# Patient Record
Sex: FEMALE | Race: BLACK OR AFRICAN AMERICAN | NOT HISPANIC OR LATINO | Employment: PART TIME | URBAN - METROPOLITAN AREA
[De-identification: names, ages, dates, MRNs, and addresses within clinical notes are randomized per-mention and may not be internally consistent; named-entity substitution may affect disease eponyms.]

---

## 2017-03-16 ENCOUNTER — OFFICE VISIT (OUTPATIENT)
Dept: FAMILY MEDICINE | Facility: CLINIC | Age: 37
End: 2017-03-16

## 2017-03-16 VITALS
WEIGHT: 255.8 LBS | TEMPERATURE: 97.9 F | DIASTOLIC BLOOD PRESSURE: 89 MMHG | HEART RATE: 85 BPM | BODY MASS INDEX: 43.67 KG/M2 | OXYGEN SATURATION: 96 % | SYSTOLIC BLOOD PRESSURE: 123 MMHG | HEIGHT: 64 IN

## 2017-03-16 DIAGNOSIS — Z11.3 SCREEN FOR STD (SEXUALLY TRANSMITTED DISEASE): ICD-10-CM

## 2017-03-16 DIAGNOSIS — Z12.4 ENCOUNTER FOR PAP CERVICAL SMEAR FOLLOWING PRIOR ABNORMAL SMEAR: Primary | ICD-10-CM

## 2017-03-16 DIAGNOSIS — B18.1 CHRONIC HEPATITIS B VIRUS INFECTION (H): ICD-10-CM

## 2017-03-16 DIAGNOSIS — Z00.00 ENCOUNTER FOR PREVENTATIVE ADULT HEALTH CARE EXAMINATION: ICD-10-CM

## 2017-03-16 PROBLEM — Z87.19: Status: ACTIVE | Noted: 2017-03-16

## 2017-03-16 NOTE — MR AVS SNAPSHOT
After Visit Summary   3/16/2017    Genaro Hamilton    MRN: [id number]           Patient Information     Date Of Birth          1980        Visit Information        Provider Department      3/16/2017 9:40 AM Zulema Cherry APRN CNP [facility]        Today's Diagnoses     Encounter for Pap cervical smear following prior abnormal smear    -  1    Encounter for preventative adult health care examination        Chronic hepatitis B virus infection (H)        Screen for STD (sexually transmitted disease)           Follow-ups after your visit        Follow-up notes from your care team     Return in about 1 year (around 3/16/2018), or if symptoms worsen or fail to improve, for BP Recheck, Physical Exam, Lab Work, Routine Visit.      Who to contact     Please call your clinic at [phone] to:    Ask questions about your health    Make or cancel appointments    Discuss your medicines    Learn about your test results    Speak to your doctor   If you have compliments or concerns about an experience at your clinic, or if you wish to file a complaint, please contact [facility] Patient Relations at [phone] or email us at [email or web address]         Additional Information About Your Visit        MyChart Information     [employer] is an electronic gateway that provides easy, online access to your medical records. With [employer], you can request a clinic appointment, read your test results, renew a prescription or communicate with your care team.     To sign up for [employer] visit the website at [email or web address]   You will be asked to enter the access code listed below, as well as some personal information. Please follow the directions to create your username and password.     Your access code is: [email or web address]  Expires: 2017  4:58 PM     Your access code will  in 90 days. If you need help or a new code, please contact your [facility] [place] [facility] or call [phone] for assistance.        Care EveryWhere ID     This is your Care EveryWhere ID. This could be used by other organizations to access your [place] medical records  [id number]        Your Vitals Were     Pulse Temperature Height Pulse Oximetry BMI (Body Mass Index)       85 97.9  F (36.6  C) (Oral) 5' 3.75\" (161.9 cm) 96% 44.25 kg/m2        Blood Pressure from Last 3 Encounters:   03/16/17 123/89   11/04/16 124/82   10/26/16 116/80    Weight from Last 3 Encounters:   03/16/17 255 lb 12.8 oz (116 kg)   11/04/16 268 lb 3.2 oz (121.7 kg)   10/26/16 267 lb 3.2 oz (121.2 kg)              We Performed the Following     Chlamydia/Gono Amplified ([facility])     GYN Cytology ([facility])        Primary Care Provider Office Phone # Fax #    Prasanna Flores [phone]       [facility] [address] [place] [address]        Thank you!     Thank you for choosing [facility]  for your care. Our goal is always to provide you with excellent care. Hearing back from our patients is one way we can continue to improve our services. Please take a few minutes to complete the written survey that you may receive in the mail after your visit with us. Thank you!             Your Updated Medication List - Protect others around you: Learn how to safely use, store and throw away your medicines at www.disposemymeds.org.          This list is accurate as of: 3/16/17 11:59 PM.  Always use your most recent med list.                   Brand Name Dispense Instructions for use    * acetaminophen 325 MG tablet    TYLENOL     Take 1 to 2 tablets every 4 hours as needed       * acetaminophen 325 MG tablet    TYLENOL    100 tablet    Take 1-2 tablets (325-650 mg) by mouth every 6 hours as needed for mild pain       CENTRUM HERBALS GARLIC PO          COCONUT OIL PO          GREEN TEA PO          HERBALS          PRENATAL 1 PO          ranitidine 75 MG " tablet    ZANTAC    30 tablet    Take 2 tablets (150 mg) by mouth 2 times daily       ULTRA WOMENS PACK Misc      Take 1 tablet by mouth daily       * Notice:  This list has 2 medication(s) that are the same as other medications prescribed for you. Read the directions carefully, and ask your doctor or other care provider to review them with you.

## 2017-03-16 NOTE — LETTER
March 20, 2017      Genaro Hamilton  [address]   [place] [address]        Dear Genaro Lam Hello. I'm pleased to report your chlamydia and gonorrhea tests returned negative, so no cervical infections noted.    Please let me know is fyou have questions about your tests, or visit, by calling [phone].    Thank you for allowing me to participate in your care,    Please see below for your test results.    Resulted Orders   Chlamydia/Gono Amplified ([facility])   Result Value Ref Range    Chlamydia trac,Amplified Prb Negative Negative    N gonorrhoeae,Amplified Prb Negative Negative    Narrative    Test performed by:  [facility]  [address] [place] [address]       If you have any questions, please call the clinic to make an appointment.    Sincerely,    BRYAN Abdi CNP

## 2017-03-16 NOTE — PROGRESS NOTES
"SUBJECTIVE: 36 year old female presents for Pap and well visit..    Past Medical History:   Diagnosis Date     Abnormal Pap smear of cervix 10/14/14    LSIL, +HPV     Hepatitis B carrier      History of malaria           LSIL=HPV pap smear . Had a treatment procedure post, has not has any FU since due to pregnancy.    Hep B chronic.  OB Hx: G:3, P:3003 All .  Had last baby girl 4 months ago, things going well.  Has 3 children, including 2 sons,  and . 2016, infant girl. Unsure whether she wants to space future children or not.  . Works in data support part time. Never smoker. Occasional ETOH. No recreational drug use.  No significant FH  OBJECTIVE: /89 (BP Location: Left arm, Patient Position: Chair, Cuff Size: Adult Large)  Pulse 85  Temp 97.9  F (36.6  C) (Oral)  Ht 5' 3.75\" (161.9 cm)  Wt 255 lb 12.8 oz (116 kg)  SpO2 96%  BMI 44.25 kg/m2  Constitutional: healthy, alert and no distress  Head: negative  Neck: Neck supple. No adenopathy. Thyroid symmetric, normal size,  ENT: ENT exam normal, no neck nodes or sinus tenderness  Cardiovascular: negative findings: regular rate and rhythm, S1 normal, S2 normal, no murmurs, clicks, or gallops  Breasts: normal without suspicious masses, skin changes or axillary nodes, symmetric fibrous changes in both upper outer quadrants, self exam is taught and encouraged.  Respiratory: negative findings: normal respiratory rate and rhythm, lungs clear to auscultation  Gastrointestinal: Abdomen soft, non-tender. BS normal. No masses, organomegaly  : Normal external genitalia without lesions and cervix appears normal, no unusual discharge or lesions.Vaginal rugae pink.  Musculoskeletal: extremities normal- no gross deformities noted, gait normal and normal muscle tone  Skin: no suspicious lesions or rashes  Neurologic: Gait normal. Reflexes normal and symmetric. Sensation grossly WNL.  Psychiatric: mentation appears normal and affect " normal/bright  Hematologic/Lymphatic/Immunologic: Normal cervical lymph nodes        ASSESSMENT/PLAN:   (Z12.4,  R87.619) Encounter for Pap cervical smear following prior abnormal smear  (primary encounter diagnosis)  Comment: Order HPV testing regardless of result  [facility]        Pending.: result, will notify w/letter if normal, by phone if abnormal.       Discussed reasons for testing and testing procedure itself.Deferred bimanual and rectovaginal exam after conversation with patient      Re: lack of evidence on value for routine women's health exam.    (Z00.00) Encounter for preventative adult health care examination  Comment: Screen for STI. Risk uncertain and patient accepted offer to screen.  Plan: Chlamydia/Gono Amplified ([facility]), GYN         Cytology ([facility])              Pending. l result, letter if normal.Will notify patient by phone if abnormal    (B18.1) Chronic hepatitis B virus infection (H)  Comment: Unclear whether monitoring has been comprehensive.  Plan: Consider referral to [place] [facility] for consult in the next year.    (Z11.3) Screen for STD (sexually transmitted disease)  Comment: As above. Recommend risk reduction by mutual monogomy vs condom use, however, unclear if condom use acceptable in patient's  Culture or Presybeterian beliefs as does not want to explore family spacing conversation in depth today.   Plan: Chlamydia[facility]Gono Ampl[facility]          Further FU pending results. RTC PRN health concerns or 1 year for repeat routine health maintenance.  30 minutes was spent on this visit, >50% counseling and coordination of care re:health maintenance,contraception    RAFAT Abdi

## 2017-03-17 LAB
C TRACH RRNA SPEC QL NAA+PROBE: NEGATIVE
N GONORRHOEA RRNA SPEC QL NAA+PROBE: NEGATIVE

## 2017-03-27 ENCOUNTER — RECORDS - HEALTHEAST (OUTPATIENT)
Dept: ADMINISTRATIVE | Facility: OTHER | Age: 37
End: 2017-03-27

## 2017-03-27 LAB
HIGH RISK?: YES
HPV REFLEX?: ABNORMAL
LAB AP ABNORMAL BLEEDING: NO
LAB AP BIRTH CONTROL/HORMONES: ABNORMAL
LAB AP CASE REPORT: ABNORMAL
LAB AP CERVICAL APPEARANCE: ABNORMAL
LAB AP GYN INTERPRETATION: ABNORMAL
LAB AP MALIGNANT?: ABNORMAL
LAB AP PATIENT STATUS: ABNORMAL
LAB AP PREVIOUS ABNL: ABNORMAL
LAB AP PREVIOUS NORMAL: ABNORMAL
LAST MENS PERIOD: ABNORMAL
SPECIMEN ADEQUACY:: ABNORMAL

## 2017-03-30 LAB
INTERPRETATION: NORMAL
INTERPRETER: NORMAL

## 2017-03-31 ENCOUNTER — TELEPHONE (OUTPATIENT)
Dept: FAMILY MEDICINE | Facility: CLINIC | Age: 37
End: 2017-03-31

## 2017-03-31 NOTE — TELEPHONE ENCOUNTER
Attempted to reach pt on home number and cellular listed  Left message on home number - cell listed is incorrect    lbetz

## 2017-04-03 ENCOUNTER — TELEPHONE (OUTPATIENT)
Dept: FAMILY MEDICINE | Facility: CLINIC | Age: 37
End: 2017-04-03

## 2017-04-03 NOTE — TELEPHONE ENCOUNTER
"ED follow up    Spoke with pt today and she reports feeling \" a little better\"      Told her to call anytime our number was 24 hrs  She said she had not known about the number and was glad to know that- voice sounded clear and strong and heard no raspiness, no coughing, said she was feeling better      lbetz  "

## 2017-04-17 PROBLEM — R87.610 PAPANICOLAOU SMEAR OF CERVIX WITH ATYPICAL SQUAMOUS CELLS OF UNDETERMINED SIGNIFICANCE (ASC-US): Status: ACTIVE | Noted: 2017-04-12

## 2017-04-19 ENCOUNTER — OFFICE VISIT (OUTPATIENT)
Dept: FAMILY MEDICINE | Facility: CLINIC | Age: 37
End: 2017-04-19

## 2017-04-19 VITALS
DIASTOLIC BLOOD PRESSURE: 86 MMHG | SYSTOLIC BLOOD PRESSURE: 132 MMHG | BODY MASS INDEX: 43.98 KG/M2 | WEIGHT: 257.6 LBS | TEMPERATURE: 98.1 F | HEIGHT: 64 IN | HEART RATE: 79 BPM

## 2017-04-19 DIAGNOSIS — R87.610 PAPANICOLAOU SMEAR OF CERVIX WITH ATYPICAL SQUAMOUS CELLS OF UNDETERMINED SIGNIFICANCE (ASC-US): Primary | ICD-10-CM

## 2017-04-19 LAB — HCG UR QL: NEGATIVE

## 2017-04-19 RX ORDER — IBUPROFEN 600 MG/1
600 TABLET, FILM COATED ORAL EVERY 6 HOURS PRN
Qty: 90 TABLET | Refills: 1 | Status: SHIPPED | OUTPATIENT
Start: 2017-04-19 | End: 2021-03-04

## 2017-04-19 NOTE — MR AVS SNAPSHOT
After Visit Summary   2017    Genaro Hamilton    MRN: [id number]           Patient Information     Date Of Birth          1980        Visit Information        Provider Department      2017 8:40 AM Shagufta Berrios MD; [facility] COLPOSCOPY ROOM [facility]        Today's Diagnoses     Papanicolaou smear of cervix with atypical squamous cells of undetermined significance (ASC-US)    -  1       Follow-ups after your visit        Who to contact     Please call your clinic at [phone] to:    Ask questions about your health    Make or cancel appointments    Discuss your medicines    Learn about your test results    Speak to your doctor   If you have compliments or concerns about an experience at your clinic, or if you wish to file a complaint, please contact [facility] Patient Relations at [phone] or email us at [email or web address]         Additional Information About Your Visit        MyChart Information     [employer] is an electronic gateway that provides easy, online access to your medical records. With [employer], you can request a clinic appointment, read your test results, renew a prescription or communicate with your care team.     To sign up for [employer]i visit the website at [email or web address]   You will be asked to enter the access code listed below, as well as some personal information. Please follow the directions to create your username and password.     Your access code is: [email or web address]  Expires: 2017  4:58 PM     Your access code will  in 90 days. If you need help or a new code, please contact your [facility] or call [phone] for assistance.        Care EveryWhere ID     This is your Care EveryWhere ID. This could be used by other organizations to access your [place] medical records  [id number]        Your Vitals Were     Pulse Temperature Height Last Period BMI (Body  "Mass Index)       79 98.1  F (36.7  C) 5' 4\" (162.6 cm) 04/01/2017 44.22 kg/m2        Blood Pressure from Last 3 Encounters:   04/19/17 132/86   03/16/17 123/89   11/04/16 124/82    Weight from Last 3 Encounters:   04/19/17 257 lb 9.6 oz (116.8 kg)   03/16/17 255 lb 12.8 oz (116 kg)   11/04/16 268 lb 3.2 oz (121.7 kg)              We Performed the Following     Colposcopy cervix with cervix biopsy and ECC     HCG Qualitative Urine (UPT)  ([facility])     Pathology  ([facility])          Today's Medication Changes          These changes are accurate as of: 4/19/17 11:59 PM.  If you have any questions, ask your nurse or doctor.               Start taking these medicines.        Dose/Directions    ibuprofen 600 MG tablet   Commonly known as:  ADVIL/MOTRIN   Used for:  Papanicolaou smear of cervix with atypical squamous cells of undetermined significance (ASC-US)   Started by:  Shagufta Berrios MD        Dose:  600 mg   Take 1 tablet (600 mg) by mouth every 6 hours as needed for moderate pain   Quantity:  90 tablet   Refills:  1            Where to get your medicines      These medications were sent to [id number] - [place], [place] - [address]  [address]  [address], [place] [address]     Phone:  [phone]     ibuprofen 600 MG tablet                Primary Care Provider Office Phone # Fax #    Prasanna Flores [phone]       [facility] [address] [place] [address]        Thank you!     Thank you for choosing [facility]  for your care. Our goal is always to provide you with excellent care. Hearing back from our patients is one way we can continue to improve our services. Please take a few minutes to complete the written survey that you may receive in the mail after your visit with us. Thank you!             Your Updated Medication List - Protect others around you: Learn how to safely use, store and throw away " your medicines at www.disposemymeds.org.          This list is accurate as of: 4/19/17 11:59 PM.  Always use your most recent med list.                   Brand Name Dispense Instructions for use    * acetaminophen 325 MG tablet    TYLENOL     Take 1 to 2 tablets every 4 hours as needed       * acetaminophen 325 MG tablet    TYLENOL    100 tablet    Take 1-2 tablets (325-650 mg) by mouth every 6 hours as needed for mild pain       CENTRUM HERBALS GARLIC PO          COCONUT OIL PO          GREEN TEA PO          HERBALS          ibuprofen 600 MG tablet    ADVIL/MOTRIN    90 tablet    Take 1 tablet (600 mg) by mouth every 6 hours as needed for moderate pain       PRENATAL 1 PO          ranitidine 75 MG tablet    ZANTAC    30 tablet    Take 2 tablets (150 mg) by mouth 2 times daily       ULTRA WOMENS PACK Misc      Take 1 tablet by mouth daily       * Notice:  This list has 2 medication(s) that are the same as other medications prescribed for you. Read the directions carefully, and ask your doctor or other care provider to review them with you.

## 2017-04-19 NOTE — PROGRESS NOTES
"  Colposcopy Visit/Procedure Note:    Genaro Hamilton is an 36 year old, , who comes in for diagnosis of abnormal pap screen.     2015 History of ART 2 s/p LEEP- conducted at [facility] (results can be found under media tab)    3/16/2017 ASCUS with negative HPV     UPT obtained and negative      Menstrual History:  Menstrual History 2015   LAST MENSTRUAL PERIOD 2015       History   Smoking Status     Never Smoker   Smokeless Tobacco     Not on file       Allergies as of 2017 - Jenaro as Reviewed 2017   Allergen Reaction Noted     Nka [no known allergies]  2014        Colposcopy Procedure:    Consent:  Details of the colposcopic procedure were reviewed. Risks, benefits of treatment, and alternate forms of evaluation were discussed.  Patient's questions were elicited and answered.   Written consent was obtained and scanned into medical record.     Verification of Procedure  Just before the procedure begins, through verbal and active participation of team members, I verified:   Initials   Patient Name tn   Patient  tn   Procedure to be performed tn       OBJECTIVE: /86  Pulse 79  Temp 98.1  F (36.7  C)  Ht 5' 4\" (162.6 cm)  Wt 257 lb 9.6 oz (116.8 kg)  LMP 2017  BMI 44.22 kg/m2    Pelvic Exam:  EG/BUS: Normal genital architecture without lesions, erythema or abnormal secretions.   Vagina: moist, pink, rugae with white, odorless and physiologic dischargesecretions  Cervix: Multiparous,      PROCEDURE:    Acetic acid and Lugol's solution applied to cervix.  Colposcopic exam of the cervix and apex of the vagina was conducted in the usual fashion.     Findings:  SCJ was  seen entirely and the exam was satisfactory.    There were acetowhite area(s) at 12:00  Lugals solution with pale yellow appearing lesion at 12:00  Biopsies were obtained at the 12 o clock position  placed in labeled Formalin Jar.    ECC: was  " obtained and placed in labeled Formalin Jar.    Assessment: cervical lesion at 12 o'clock    Plan:     Biopsies sent to pathology.  Will contact patient with results and recommended follow-up plan.     Patient advised to contact clinic with heavy vaginal bleeding, fever over 101 degrees F, or any other concerns.    Verbalized understanding and agreement with plan.    Shagufta Berrios PGY3  Attending:  Dr Aida Molina present for entire procedure

## 2017-04-24 ENCOUNTER — RECORDS - HEALTHEAST (OUTPATIENT)
Dept: ADMINISTRATIVE | Facility: OTHER | Age: 37
End: 2017-04-24

## 2017-04-24 LAB
LAB AP CASE REPORT: NORMAL
LAB AP CHARGES: NORMAL
LAB AP CLINICAL INFORMATION: NORMAL
LAB AP DIAGNOSIS COMMENT: NORMAL
LAB AP FINAL DIAGNOSIS: NORMAL
LAB AP GROSS DESCRIPTION: NORMAL
LAB AP MALIGNANT?: NORMAL
LAB AP MICROSCOPIC DESCRIPTION: NORMAL

## 2017-04-25 NOTE — PROGRESS NOTES
I was present for entirety of uncomplicated colposcopy as described and documented by Dr Berrios.  Procedure uncomplicated.  Bx pending.    Kristi Molina MD

## 2017-10-27 ENCOUNTER — ALLIED HEALTH/NURSE VISIT (OUTPATIENT)
Dept: FAMILY MEDICINE | Facility: CLINIC | Age: 37
End: 2017-10-27

## 2017-10-27 DIAGNOSIS — Z23 NEEDS FLU SHOT: Primary | ICD-10-CM

## 2017-10-27 NOTE — NURSING NOTE

## 2017-10-27 NOTE — LETTER
October 27, 2017      Genaro Hamilton  [address]   [place] [address]          To Whom it May Concern    Genaro Hamilton received her Flu Shot in clinic today 10/27/17.    Sincerely,    Pv p Nurse

## 2017-10-27 NOTE — MR AVS SNAPSHOT
After Visit Summary   10/27/2017    Genaro Hamilton    MRN: [id number]           Patient Information     Date Of Birth          1980        Visit Information        Provider Department      10/27/2017 9:30 AM NurseRaji caroline [facility]        Today's Diagnoses     Needs flu shot    -  1       Follow-ups after your visit        Who to contact     Please call your clinic at [phone] to:    Ask questions about your health    Make or cancel appointments    Discuss your medicines    Learn about your test results    Speak to your doctor   If you have compliments or concerns about an experience at your clinic, or if you wish to file a complaint, please contact [facility] Patient Relations at [phone] or email us at [email or web address]         Additional Information About Your Visit        MyChart Information     [employer] is an electronic gateway that provides easy, online access to your medical records. With [employer], you can request a clinic appointment, read your test results, renew a prescription or communicate with your care team.     To sign up for [employer] visit the website at [email or web address]   You will be asked to enter the access code listed below, as well as some personal information. Please follow the directions to create your username and password.     Your access code is: [email or web address]  Expires: 2018 10:11 AM     Your access code will  in 90 days. If you need help or a new code, please contact your [facility] or call [phone] for assistance.        Care EveryWhere ID     This is your Care EveryWhere ID. This could be used by other organizations to access your [place] medical records  [id number]         Blood Pressure from Last 3 Encounters:   17 132/86   17 123/89   16 124/82    Weight from Last 3 Encounters:   17 257 lb 9.6 oz (116.8 kg)   17 255 lb  12.8 oz (116 kg)   11/04/16 268 lb 3.2 oz (121.7 kg)              We Performed the Following     ADMIN VACCINE, INITIAL     FLU VAC PRESRV FREE QUAD SPLIT VIR IM, 0.5 mL dosage        Primary Care Provider Office Phone # Fax #    Rik Rodriguez [phone]       [facility] [address] [place] [address]        Equal Access to Services     [facility] : Hadii aad ku hadasho Soomaali, waaxda luqadaha, qaybta kaalmada adeegyada, waxay idiin hayaan adeeg kharash la'aan . So [facility] [phone].    ATENCIÓN: Si mike layton, tiene a munguia disposición servicios gratuitos de asistencia lingüística. [place] [phone].    We comply with applicable federal civil rights laws and [place] laws. We do not discriminate on the basis of race, color, national origin, age, disability, sex, sexual orientation, or gender identity.            Thank you!     Thank you for choosing [facility]  for your care. Our goal is always to provide you with excellent care. Hearing back from our patients is one way we can continue to improve our services. Please take a few minutes to complete the written survey that you may receive in the mail after your visit with us. Thank you!             Your Updated Medication List - Protect others around you: Learn how to safely use, store and throw away your medicines at www.disposemymeds.org.          This list is accurate as of: 10/27/17 10:11 AM.  Always use your most recent med list.                   Brand Name Dispense Instructions for use Diagnosis    * acetaminophen 325 MG tablet    TYLENOL     Take 1 to 2 tablets every 4 hours as needed        * acetaminophen 325 MG tablet    TYLENOL    100 tablet    Take 1-2 tablets (325-650 mg) by mouth every 6 hours as needed for mild pain    Episodic tension-type headache, not intractable       CENTRUM HERBALS GARLIC PO           COCONUT OIL PO           GREEN TEA PO           HERBALS           ibuprofen 600 MG  tablet    ADVIL/MOTRIN    90 tablet    Take 1 tablet (600 mg) by mouth every 6 hours as needed for moderate pain    Papanicolaou smear of cervix with atypical squamous cells of undetermined significance (ASC-US)       PRENATAL 1 PO           ranitidine 75 MG tablet    ZANTAC    30 tablet    Take 2 tablets (150 mg) by mouth 2 times daily    Gastroesophageal reflux disease without esophagitis       ULTRA WOMENS PACK Misc      Take 1 tablet by mouth daily        * Notice:  This list has 2 medication(s) that are the same as other medications prescribed for you. Read the directions carefully, and ask your doctor or other care provider to review them with you.

## 2018-06-06 ENCOUNTER — OFFICE VISIT (OUTPATIENT)
Dept: FAMILY MEDICINE | Facility: CLINIC | Age: 38
End: 2018-06-06
Payer: COMMERCIAL

## 2018-06-06 VITALS
BODY MASS INDEX: 42.72 KG/M2 | HEIGHT: 64 IN | SYSTOLIC BLOOD PRESSURE: 118 MMHG | TEMPERATURE: 97.4 F | WEIGHT: 250.2 LBS | DIASTOLIC BLOOD PRESSURE: 80 MMHG | OXYGEN SATURATION: 97 % | HEART RATE: 73 BPM

## 2018-06-06 DIAGNOSIS — R87.610 PAPANICOLAOU SMEAR OF CERVIX WITH ATYPICAL SQUAMOUS CELLS OF UNDETERMINED SIGNIFICANCE (ASC-US): ICD-10-CM

## 2018-06-06 DIAGNOSIS — R07.81 RIB PAIN ON RIGHT SIDE: ICD-10-CM

## 2018-06-06 DIAGNOSIS — R05.8 DRY COUGH: Primary | ICD-10-CM

## 2018-06-06 NOTE — PROGRESS NOTES
"       HPI:   Genaro Hamilton is a 37 year old  female with PMH of obesity, chronic Hep B, and abnormal pap smears who presents with:    Cough:  -dry cough, strong  -since birth of her daughter (18 months)  -off and on, often daily  -causes occasional urinary incontinence  -no fevers or chills  -does not feel sick  -no one sick at home  -no congestion, facial pain  -non smoker    Right side pain:  -right side pain, she thinks from coughing  -no increased strain/activity otherwise  -more pronounced the last 2 weeks  -hasn't taken any pain medications         PMHX:     Patient Active Problem List   Diagnosis     Chronic hepatitis B virus infection (H)     Pruritic disorder     Papanicolaou smear of cervix with low grade squamous intraepithelial lesion (LGSIL)     Papanicolaou smear of cervix with atypical squamous cells of undetermined significance (ASC-US)     Encounter for supervision of multigravida of advanced maternal age in second trimester     Morbid obesity due to excess calories (H)       Current Outpatient Prescriptions   Medication Sig Dispense Refill     ibuprofen (ADVIL/MOTRIN) 600 MG tablet Take 1 tablet (600 mg) by mouth every 6 hours as needed for moderate pain 90 tablet 1       Social History   Substance Use Topics     Smoking status: Never Smoker     Smokeless tobacco: Never Used     Alcohol use 0.0 oz/week     0 Standard drinks or equivalent per week      Comment: Occasional       Allergies   Allergen Reactions     Nka [No Known Allergies]        No results found for this or any previous visit (from the past 24 hour(s)).         Review of Systems:   See HPI.          Physical Exam:     Vitals:    06/06/18 1016   BP: 118/80   Pulse: 73   Temp: 97.4  F (36.3  C)   TempSrc: Oral   SpO2: 97%   Weight: 250 lb 3.2 oz (113.5 kg)   Height: 5' 3.98\" (162.5 cm)     Body mass index is 42.98 kg/(m^2).    General: Alert, friendly, obese,  female  HEENT: PERRL, moist oral mucus membranes, " erythematous and swollen nasal turbinates, no rhinorrhea, no throat erythema or exudates   Pulm: CTA BL, no tachypnea  CV: RRR, no murmur  Abd: soft, NTND, no masses  Skin: No rash on limited skin exam  Back: tenderness to palpation of posterior ribs 6-8  Psych: Mood appropriate to visit content, full affect, rational thought content and process    Assessment and Plan   1. Dry cough: Post-nasal drip most likely given allergic appearance of nasal canal and history. Given duration of cough a chest xray was obtained and appeared normal.   -Trial flonase and clairitin for symptoms  -Follow up in 2 weeks to evaluate for improvement of symptoms  - XR CHEST 2 VW    2. Rib pain on right side: Pain with coughing and palpation to posterior right ribs 6-8. Appears musculoskeletal in nature, possibly 2/2 coughing. No bony abnormality noted on CXR. Symptomatic treatment encouraged including alternating tylenol/ibuprofen, heat/ice.     Follow up: 2 weeks for physical  Abnormal pap history:  2015: LGSIL on pap-->CIN2 on colp s/p LEEP with clear margins  2017: ASCUS on pap, neg HPV. Per guidelines, should have a pap with cotesting at 12 and 24 months. If any abnormal-->colposcopy. If neg x2-->repeat cotesting in 3 years  Options for treatment and follow-up care were reviewed with the patient and/or guardian. Genaro Hamilton and/or guardian engaged in the decision making process and verbalized understanding of the options discussed and agreed with the final plan.    Vaishnavi Connell DO  [facility] Family Medicine Resident    Precepted with: Jae Ly MD

## 2018-06-06 NOTE — MR AVS SNAPSHOT
"              After Visit Summary   6/6/2018    Genaro Hamilton    MRN: [id number]           Patient Information     Date Of Birth          1980        Visit Information        Provider Department      6/6/2018 10:40 AM Vaishnavi Connell DO [facility]        Today's Diagnoses     Dry cough    -  1       Follow-ups after your visit        Who to contact     Please call your clinic at [phone] to:    Ask questions about your health    Make or cancel appointments    Discuss your medicines    Learn about your test results    Speak to your doctor            Additional Information About Your Visit        Care EveryWhere ID     This is your Care EveryWhere ID. This could be used by other organizations to access your [facility] medical records  [id number]        Your Vitals Were     Pulse Temperature Height Last Period Pulse Oximetry BMI (Body Mass Index)    73 97.4  F (36.3  C) (Oral) 5' 3.98\" (162.5 cm) 05/22/2018 97% 42.98 kg/m2       Blood Pressure from Last 3 Encounters:   06/06/18 118/80   04/19/17 132/86   03/16/17 123/89    Weight from Last 3 Encounters:   06/06/18 250 lb 3.2 oz (113.5 kg)   04/19/17 257 lb 9.6 oz (116.8 kg)   03/16/17 255 lb 12.8 oz (116 kg)              We Performed the Following     XR CHEST 2 VW        Primary Care Provider Office Phone # Fax #    Rik Rodriguez [phone]       [facility] [address] [place] [address]        Equal Access to Services     ZION CASTELLANOS AH: Hadii aad ku hadasho Soomaali, waaxda luqadaha, qaybta kaalmada adeegyada, waxnina idiin haydulcen carin arrington la'vita . So [facility] [phone].    ATENCIÓN: Si habla español, tiene a munguia disposición servicios gratuitos de asistencia lingüística. Llame al [phone].    We comply with applicable federal civil rights laws and [place] laws. We do not discriminate on the basis of race, color, national origin, age, disability, sex, sexual orientation, or gender " identity.            Thank you!     Thank you for choosing [facility]  for your care. Our goal is always to provide you with excellent care. Hearing back from our patients is one way we can continue to improve our services. Please take a few minutes to complete the written survey that you may receive in the mail after your visit with us. Thank you!             Your Updated Medication List - Protect others around you: Learn how to safely use, store and throw away your medicines at www.disposemymeds.org.          This list is accurate as of 6/6/18 11:30 AM.  Always use your most recent med list.                   Brand Name Dispense Instructions for use Diagnosis    * acetaminophen 325 MG tablet    TYLENOL     Take 1 to 2 tablets every 4 hours as needed        * acetaminophen 325 MG tablet    TYLENOL    100 tablet    Take 1-2 tablets (325-650 mg) by mouth every 6 hours as needed for mild pain    Episodic tension-type headache, not intractable       CENTRUM HERBALS GARLIC PO           COCONUT OIL PO           GREEN TEA PO           HERBALS           ibuprofen 600 MG tablet    ADVIL/MOTRIN    90 tablet    Take 1 tablet (600 mg) by mouth every 6 hours as needed for moderate pain    Papanicolaou smear of cervix with atypical squamous cells of undetermined significance (ASC-US)       PRENATAL 1 PO           ranitidine 75 MG tablet    ZANTAC    30 tablet    Take 2 tablets (150 mg) by mouth 2 times daily    Gastroesophageal reflux disease without esophagitis       ULTRA WOMENS PACK Misc      Take 1 tablet by mouth daily        * Notice:  This list has 2 medication(s) that are the same as other medications prescribed for you. Read the directions carefully, and ask your doctor or other care provider to review them with you.

## 2018-06-26 ENCOUNTER — HEALTH MAINTENANCE LETTER (OUTPATIENT)
Age: 38
End: 2018-06-26

## 2018-10-04 ENCOUNTER — OFFICE VISIT (OUTPATIENT)
Dept: FAMILY MEDICINE | Facility: CLINIC | Age: 38
End: 2018-10-04
Payer: COMMERCIAL

## 2018-10-04 VITALS
RESPIRATION RATE: 19 BRPM | TEMPERATURE: 98.1 F | SYSTOLIC BLOOD PRESSURE: 127 MMHG | DIASTOLIC BLOOD PRESSURE: 86 MMHG | WEIGHT: 252.5 LBS | OXYGEN SATURATION: 98 % | HEART RATE: 68 BPM | BODY MASS INDEX: 43.11 KG/M2 | HEIGHT: 64 IN

## 2018-10-04 DIAGNOSIS — Z12.4 SCREENING FOR CERVICAL CANCER: ICD-10-CM

## 2018-10-04 DIAGNOSIS — Z00.00 ROUTINE HISTORY AND PHYSICAL EXAMINATION OF ADULT: Primary | ICD-10-CM

## 2018-10-04 DIAGNOSIS — G44.209 TENSION HEADACHE: ICD-10-CM

## 2018-10-04 DIAGNOSIS — Z11.51 SCREENING FOR HUMAN PAPILLOMAVIRUS: ICD-10-CM

## 2018-10-04 DIAGNOSIS — R05.9 COUGH: ICD-10-CM

## 2018-10-04 LAB
ALBUMIN SERPL-MCNC: 3.4 G/DL (ref 3.3–4.6)
ALP SERPL-CCNC: 40 U/L (ref 40–150)
ALT SERPL-CCNC: 28 U/L (ref 0–45)
AST SERPL-CCNC: 27 U/L (ref 0–45)
BILIRUB SERPL-MCNC: 0.5 MG/DL (ref 0.2–1.3)
BUN SERPL-MCNC: 6 MG/DL (ref 5–24)
CALCIUM SERPL-MCNC: 9 MG/DL (ref 8.5–10.4)
CHLORIDE SERPLBLD-SCNC: 103 MMOL/L (ref 94–109)
CO2 SERPL-SCNC: 28 MMOL/L (ref 20–32)
CREAT SERPL-MCNC: 0.8 MG/DL (ref 0.6–1.3)
EGFR CALCULATED (BLACK REFERENCE): >90 ML/MIN
EGFR CALCULATED (NON BLACK REFERENCE): 85.3 ML/MIN
GLUCOSE SERPL-MCNC: 90 MG/DL (ref 60–109)
HEMOGLOBIN: 12.1 G/DL (ref 11.7–15.7)
POTASSIUM SERPL-SCNC: 4.2 MMOL/L (ref 3.4–5.3)
PROT SERPL-MCNC: 6.9 G/DL (ref 6.6–8.8)
SODIUM SERPL-SCNC: 140 MMOL/L (ref 133–144)

## 2018-10-04 RX ORDER — BENZONATATE 100 MG/1
100 CAPSULE ORAL 3 TIMES DAILY PRN
Qty: 42 CAPSULE | Refills: 3 | Status: SHIPPED | OUTPATIENT
Start: 2018-10-04 | End: 2019-11-08

## 2018-10-04 NOTE — PATIENT INSTRUCTIONS
You had a pap test and lab work today. We will send you a letter with your results.  Take tylenol and ibuprofen for your headache. You have nasal congestions that will get better with time and fluids.  Take the Tessalon pills for your cough.        Preventive Health Recommendations  Female Ages 26 - 39  Yearly exam:   See your health care provider every year in order to    Review health changes.     Discuss preventive care.      Review your medicines if you your doctor has prescribed any.    Until age 30: Get a Pap test every three years (more often if you have had an abnormal result).    After age 30: Talk to your doctor about whether you should have a Pap test every 3 years or have a Pap test with HPV screening every 5 years.   You do not need a Pap test if your uterus was removed (hysterectomy) and you have not had cancer.  You should be tested each year for STDs (sexually transmitted diseases), if you're at risk.   Talk to your provider about how often to have your cholesterol checked.  If you are at risk for diabetes, you should have a diabetes test (fasting glucose).  Shots: Get a flu shot each year. Get a tetanus shot every 10 years.   Nutrition:     Eat at least 5 servings of fruits and vegetables each day.    Eat whole-grain bread, whole-wheat pasta and brown rice instead of white grains and rice.    Get adequate Calcium and Vitamin D.     Lifestyle    Exercise at least 150 minutes a week (30 minutes a day, 5 days of the week). This will help you control your weight and prevent disease.    Limit alcohol to one drink per day.    No smoking.     Wear sunscreen to prevent skin cancer.    See your dentist every six months for an exam and cleaning.

## 2018-10-04 NOTE — MR AVS SNAPSHOT
After Visit Summary   10/4/2018    Genaro Hamilton    MRN: [id number]           Patient Information     Date Of Birth          1980        Visit Information        Provider Department      10/4/2018 9:20 AM Lorne Kaminski MD [facility]        Today's Diagnoses     Cough    -  1    Screening for cervical cancer        Screening for human papillomavirus        Tension headache        Routine history and physical examination of adult          Care Instructions    You had a pap test and lab work today. We will send you a letter with your results.  Take tylenol and ibuprofen for your headache. You have nasal congestions that will get better with time and fluids.  Take the Tessalon pills for your cough.        Preventive Health Recommendations  Female Ages 26 - 39  Yearly exam:   See your health care provider every year in order to    Review health changes.     Discuss preventive care.      Review your medicines if you your doctor has prescribed any.    Until age 30: Get a Pap test every three years (more often if you have had an abnormal result).    After age 30: Talk to your doctor about whether you should have a Pap test every 3 years or have a Pap test with HPV screening every 5 years.   You do not need a Pap test if your uterus was removed (hysterectomy) and you have not had cancer.  You should be tested each year for STDs (sexually transmitted diseases), if you're at risk.   Talk to your provider about how often to have your cholesterol checked.  If you are at risk for diabetes, you should have a diabetes test (fasting glucose).  Shots: Get a flu shot each year. Get a tetanus shot every 10 years.   Nutrition:     Eat at least 5 servings of fruits and vegetables each day.    Eat whole-grain bread, whole-wheat pasta and brown rice instead of white grains and rice.    Get adequate Calcium and Vitamin D.     Lifestyle    Exercise at least 150 minutes a week (30 minutes a day, 5  "days of the week). This will help you control your weight and prevent disease.    Limit alcohol to one drink per day.    No smoking.     Wear sunscreen to prevent skin cancer.    See your dentist every six months for an exam and cleaning.            Follow-ups after your visit        Who to contact     Please call your clinic at [phone] to:    Ask questions about your health    Make or cancel appointments    Discuss your medicines    Learn about your test results    Speak to your doctor            Additional Information About Your Visit        Care EveryWhere ID     This is your Care EveryWhere ID. This could be used by other organizations to access your [facility] medical records  [id number]        Your Vitals Were     Pulse Temperature Respirations Height Last Period Pulse Oximetry    68 98.1  F (36.7  C) (Oral) 19 5' 3.78\" (162 cm) 09/28/2018 98%    BMI (Body Mass Index)                   43.64 kg/m2            Blood Pressure from Last 3 Encounters:   10/04/18 127/86   06/06/18 118/80   04/19/17 132/86    Weight from Last 3 Encounters:   10/04/18 252 lb 8 oz (114.5 kg)   06/06/18 250 lb 3.2 oz (113.5 kg)   04/19/17 257 lb 9.6 oz (116.8 kg)              We Performed the Following     Comprehensive Metabolic Panel ([facility]) - results <1hr Protocol     GYN Cytology ([facility])     Hemoglobin (HGB) ([facility])     HPV [place] PCR ([facility])          Today's Medication Changes          These changes are accurate as of 10/4/18 10:33 AM.  If you have any questions, ask your nurse or doctor.               Start taking these medicines.        Dose/Directions    benzonatate 100 MG capsule   Commonly known as:  TESSALON   Used for:  Cough   Started by:  Lorne Kaminski MD        Dose:  100 mg   Take 1 capsule (100 mg) by mouth 3 times daily as needed for cough   Quantity:  42 capsule   Refills:  3            Where to get your medicines      These medications were sent to [id number] - [place], " [place] - [address]  [address], [place] [address]     Phone:  [phone]     benzonatate 100 MG capsule                Primary Care Provider Office Phone # Fax #    Rik Rodriguez [phone]       [facility] [address] [place] [address]        Equal Access to Services     ZION CASTELLANOS : Hadii aad ku hadasho Soomaali, waaxda luqadaha, qaybta kaalmada adeegyada, waxay idiin hayaan adeeg kharash la'dulcen . So [facility] [phone].    ATENCIÓN: Si anjalila español, tiene a munguia disposición servicios gratuitos de asistencia lingüística. Llame al [phone].    We comply with applicable federal civil rights laws and [place] laws. We do not discriminate on the basis of race, color, national origin, age, disability, sex, sexual orientation, or gender identity.            Thank you!     Thank you for choosing [facility]  for your care. Our goal is always to provide you with excellent care. Hearing back from our patients is one way we can continue to improve our services. Please take a few minutes to complete the written survey that you may receive in the mail after your visit with us. Thank you!             Your Updated Medication List - Protect others around you: Learn how to safely use, store and throw away your medicines at www.disposemymeds.org.          This list is accurate as of 10/4/18 10:33 AM.  Always use your most recent med list.                   Brand Name Dispense Instructions for use Diagnosis    benzonatate 100 MG capsule    TESSALON    42 capsule    Take 1 capsule (100 mg) by mouth 3 times daily as needed for cough    Cough       ibuprofen 600 MG tablet    ADVIL/MOTRIN    90 tablet    Take 1 tablet (600 mg) by mouth every 6 hours as needed for moderate pain    Papanicolaou smear of cervix with atypical squamous cells of undetermined significance (ASC-US)

## 2018-10-04 NOTE — LETTER
RETURN TO WORK/SCHOOL FORM    10/4/2018    Re: Genaro Hamilton  1980      To Whom It May Concern:     Genaro Hamilton was seen in clinic today..  She may return to work with restrictions on 10/04/18        Restrictions:  Nonefull duty      Lorne Olguin MD  10/4/2018 11:01 AM

## 2018-10-04 NOTE — PROGRESS NOTES
Preceptor Attestation:   Patient seen, evaluated and discussed with the resident, Dr. Lorne Olguin. I have verified the content of the note, which accurately reflects my assessment of the patient and the plan of care.  Supervising Physician:Alexandra Edgar MD  [facility]

## 2018-10-04 NOTE — PROGRESS NOTES
Female Physical Note  Genaro Hamilton is a 38 year old female with PMH significant for chronic hep B infection, morbid obesity, prior abnormal pap who presents to the clinic today for the following:       Concerns today:   1. Headache    Last 5 days has had a cold, congestion, and headache that is pulsing. She has a history of headaches and this feels similar. Usually headaches go away with ibuprofen but this has not. Patient does not want to pay for any new medications.    2. Cough   Was here 3 months ago for cough that was related to allergies. Describes post nasal drip symptoms today causing her pain. Has a cold right now and feels congested. In the past was prescribed Claritin and Flonase but does not want to pay for them today.      ROS:  CONSTITUTIONAL: no fatigue, no unexpected change in weight  SKIN: no worrisome rashes, no worrisome moles, no worrisome lesions  EYES: no acute vision problems or changes  ENT: no ear problems, no mouth problems, no throat problems  RESP: no significant cough, no shortness of breath  CV: no chest pain, no palpitations, no new or worsening peripheral edema  GI: no nausea, no vomiting, no constipation, no diarrhea    Sexually Active: Yes  Sexual concerns: No   Contraception:Details: not using birth control, ok to be pregnant again    P: 3013  Menarche: not sure, primary school Patient's last menstrual period was 2018. last week   STD History: Neg  Last Pap Smear Date: 2017  Abnormal Pap History:   2015: LGSIL on pap-->CIN2 on colp s/p LEEP with clear margins  2017: ASCUS on pap, neg HPV. Per guidelines, should have a pap with cotesting at 12 and 24 months. If any abnormal-->colposcopy. If neg x2-->repeat cotesting in 3 years      Patient Active Problem List   Diagnosis     Chronic hepatitis B virus infection (H)     Pruritic disorder     Papanicolaou smear of cervix with low grade squamous intraepithelial lesion (LGSIL)     Papanicolaou smear of cervix  with atypical squamous cells of undetermined significance (ASC-US)     Encounter for supervision of multigravida of advanced maternal age in second trimester     Morbid obesity due to excess calories (H)       Current Outpatient Prescriptions   Medication Sig Dispense Refill     benzonatate (TESSALON) 100 MG capsule Take 1 capsule (100 mg) by mouth 3 times daily as needed for cough 42 capsule 3     ibuprofen (ADVIL/MOTRIN) 600 MG tablet Take 1 tablet (600 mg) by mouth every 6 hours as needed for moderate pain 90 tablet 1       Past Medical History:   Diagnosis Date     Abnormal Pap smear of cervix 10/14/14    LSIL, +HPV     Hepatitis B carrier (H)      History of malaria 2011        Family History     Problem (# of Occurrences) Relation (Name,Age of Onset)    Other - See Comments (1) Mother: chest pain       Negative family history of: Diabetes, Coronary Artery Disease, Hypertension, Breast Cancer, Colon Cancer, Prostate Cancer, Other Cancer, Cerebrovascular Disease, Asthma          Problem List Medication List and Allergy List were reviewed.    Patient is an established patient of this clinic..    Social History   Substance Use Topics     Smoking status: Former Smoker     Years: 5.00     Smokeless tobacco: Never Used     Alcohol use 0.0 oz/week     0 Standard drinks or equivalent per week      Comment: Occasional       Children ? yes 3    Has anyone hurt you physically, for example by pushing, hitting, slapping or kicking you or forcing you to have sex? Denies  Do you feel threatened or controlled by a partner, ex-partner or anyone in your life? Denies    RISK BEHAVIORS AND HEALTHY HABITS:  Tobacco Use/Smoking: None  Illicit Drug Use: None  Do you use alcohol? Occasional   Diet (5-7 servings of fruits/veg daily): Yes   Exercise (30 min accumulated most days): no, wants to start. Says weight has been up and down.   Dental Care: No   Calcium 1500 mg/d:  Yes  Seat Belt Use: Yes     Pooled Cohort Risk  "Calculator    Immunization History   Administered Date(s) Administered     Influenza (IIV3) PF 12/09/2011, 11/01/2013     Influenza Vaccine IM 3yrs+ 4 Valent IIV4 10/15/2014, 10/23/2015, 09/28/2016, 10/27/2017     Influenza Vaccine, 3 YRS +, IM (QUADRIVALENT W/PRESERVATIVES) 10/04/2018     Mantoux Tuberculin Skin Test 03/21/2014     TDAP Vaccine (Boostrix) 09/28/2016     Tdap (Adacel,Boostrix) 08/09/2012    Reviewed Immunization Record Today and influenza due today.    EXAMINATION:   /86 (BP Location: Right arm, Patient Position: Sitting, Cuff Size: Adult Large)  Pulse 68  Temp 98.1  F (36.7  C) (Oral)  Resp 19  Ht 5' 3.78\" (162 cm)  Wt 252 lb 8 oz (114.5 kg)  LMP 09/28/2018  SpO2 98%  BMI 43.64 kg/m2  GENERAL: healthy, alert and no distress  EYES: Eyes grossly normal to inspection, extraocular movements - intact, and PERRL  HENT: ear canals- normal; TMs- normal; Nose- normal; Mouth- no ulcers, no lesions  NECK: no tenderness, no adenopathy, no asymmetry, no masses, no stiffness; thyroid- normal to palpation  RESP: lungs clear to auscultation - no rales, no rhonchi, no wheezes  BREAST: no masses, no tenderness, no nipple discharge, no palpable axillary masses or adenopathy  CV: regular rates and rhythm, normal S1 S2, no S3 or S4 and no murmur, no click or rub -  ABDOMEN: soft, no tenderness, no  hepatosplenomegaly, no masses, normal bowel sounds. Obese.  MS: extremities- no gross deformities noted, no edema  SKIN: no suspicious lesions, no rashes  NEURO: strength and tone- normal, sensory exam- grossly normal, mentation- intact, speech- normal, reflexes- symmetric  - female: cervix- normal, adnexae- normal; uterus- normal, no masses, no discharge  PSYCH: Alert and oriented times 3; speech- coherent , normal rate and volume; able to articulate logical thoughts    ASSESSMENT:    (Z00.00) Routine history and physical examination of adult  (primary encounter diagnosis)  Comment: Patient has history of " hep B and is wondering she could have las checked, has not had any in a while. Also has regular but sometimes heavy periods so will check Hgb  Plan: Comprehensive Metabolic Panel ([facility]) -         results <1hr Protocol, Hemoglobin (HGB) ([facility]), ADMIN VACCINE, INITIAL, FLU VAC         QUADRIVLENT SPLIT VIRUS IM 0.5ml dosage  - will check CMP based on history of hep B and patient concerned about this history  - has regular but sometimes heavy periods per patient. Will check hgb        (Z12.4) Screening for cervical cancer  Comment: No abnormal discharge, itching, burning, or pain. No concern for STD. Patient agree to pap and HPV test today but declines any other testing. 2015: LGSIL on pap-->CIN2 on colp s/p LEEP with clear margins  2017: ASCUS on pap, neg HPV. Per guidelines, should have a pap with cotesting at 12 and 24 months. If any abnormal-->colposcopy. If neg x2-->repeat cotesting in 3 years  Plan: HPV [place] PCR ([facility]), GYN Cytology         ([facility]), CANCELED: Wet Prep ([facility])    (Z11.51) Screening for human papillomavirus  Comment: No abnormal discharge, itching, burning, or pain. No concern for STD. Patient agree to pap and HPV test today but declines any other testing. 2015: LGSIL on pap-->CIN2 on colp s/p LEEP with clear margins  2017: ASCUS on pap, neg HPV. Per guidelines, should have a pap with cotesting at 12 and 24 months. If any abnormal-->colposcopy. If neg x2-->repeat cotesting in 3 years  Plan: HPV [place] PCR ([facility]), GYN Cytology         ([facility]), CANCELED: Wet Prep ([facility])      (R05) Cough  Comment: Symptoms sound like post nasal drip. Has symptoms of allergies. In the past has been prescribed Claritin and Flonase but patient does not want to pay for these.   Plan: benzonatate (TESSALON) 100 MG capsule  - educated patient on fluids and time to help with cold symptoms she's currently experiencing. Has symptoms of allergies in the past but again patient declines to  buy Claritin or Flonase  - will try tessalon     (G44.209) Tension headache  Comment: Likely due to current cold aggravating her existing history of tension headaches.   Plan:   - educated patient on headache and warning signs  - she plans to use ibuprofen and tylenol from home           Lorne Olguin MD   PGY1 [facility] Resident   Pager: [phone]    Precepted with Dr. Edgar

## 2018-10-04 NOTE — NURSING NOTE
Genaro Hamilton      1.  Has the patient received the information for the influenza vaccine? NO    2.  Does the patient have any of the following contraindications?     Allergy to eggs? No     Allergic reaction to previous influenza vaccines? No     Any other problems to previous influenza vaccines? No     Paralyzed by Guillain-Catlin syndrome? No     Currently pregnant? NO     Current moderate or severe illness? No    Vaccination given by Nicolasa Null MA.  Recorded by Nicolasa Null

## 2018-10-05 ENCOUNTER — RECORDS - HEALTHEAST (OUTPATIENT)
Dept: ADMINISTRATIVE | Facility: OTHER | Age: 38
End: 2018-10-05

## 2018-10-05 LAB
FINAL DIAGNOSIS: NORMAL
HPV 16 DNA: NEGATIVE
HPV 18 DNA: NEGATIVE
HPV SOURCE: NORMAL
OTHER HR HPV: NEGATIVE
SPECIMEN DESCRIPTION: NORMAL

## 2018-10-17 ENCOUNTER — RECORDS - HEALTHEAST (OUTPATIENT)
Dept: ADMINISTRATIVE | Facility: OTHER | Age: 38
End: 2018-10-17

## 2018-10-17 LAB
CYTOLOGY CVX/VAG DOC THIN PREP: NORMAL
HIGH RISK?: YES
HPV REFLEX?: NORMAL
LAB AP ABNORMAL BLEEDING: NO
LAB AP BIRTH CONTROL/HORMONES: NORMAL
LAB AP CASE REPORT: NORMAL
LAB AP CERVICAL APPEARANCE: NORMAL
LAB AP MALIGNANT?: NORMAL
LAB AP PATIENT STATUS: NORMAL
LAB AP PREVIOUS ABNL: 2017
LAB AP PREVIOUS NORMAL: 2017
LAST MENS PERIOD: NORMAL
SPECIMEN ADEQUACY:: NORMAL

## 2019-02-22 NOTE — PROGRESS NOTES
Please call:    Bang grant,  Your biopsy results are back and look very good.  There are some atypical cells which were also seen on your pap smear- the HPV test is negative which is reassuring.  We will recheck your pap smear and HPV testing in 1 year.  Thank you  Dr Berrios     Presented with flash pulmonary edema  Acute on chronic CHF  NSTEMI.  Plan is for [facility] today.

## 2019-11-08 ENCOUNTER — OFFICE VISIT (OUTPATIENT)
Dept: FAMILY MEDICINE | Facility: CLINIC | Age: 39
End: 2019-11-08
Payer: COMMERCIAL

## 2019-11-08 VITALS
RESPIRATION RATE: 20 BRPM | WEIGHT: 256 LBS | HEART RATE: 74 BPM | OXYGEN SATURATION: 99 % | DIASTOLIC BLOOD PRESSURE: 88 MMHG | BODY MASS INDEX: 43.71 KG/M2 | HEIGHT: 64 IN | SYSTOLIC BLOOD PRESSURE: 134 MMHG

## 2019-11-08 DIAGNOSIS — E66.01 CLASS 3 SEVERE OBESITY DUE TO EXCESS CALORIES WITHOUT SERIOUS COMORBIDITY WITH BODY MASS INDEX (BMI) OF 45.0 TO 49.9 IN ADULT (H): ICD-10-CM

## 2019-11-08 DIAGNOSIS — Z12.4 SCREENING FOR CERVICAL CANCER: ICD-10-CM

## 2019-11-08 DIAGNOSIS — B18.1 CHRONIC VIRAL HEPATITIS B WITHOUT DELTA AGENT AND WITHOUT COMA (H): Primary | ICD-10-CM

## 2019-11-08 DIAGNOSIS — Z00.00 ROUTINE GENERAL MEDICAL EXAMINATION AT A HEALTH CARE FACILITY: ICD-10-CM

## 2019-11-08 DIAGNOSIS — E66.813 CLASS 3 SEVERE OBESITY DUE TO EXCESS CALORIES WITHOUT SERIOUS COMORBIDITY WITH BODY MASS INDEX (BMI) OF 45.0 TO 49.9 IN ADULT (H): ICD-10-CM

## 2019-11-08 LAB — HBA1C MFR BLD: 4.9 % (ref 4.1–5.7)

## 2019-11-08 ASSESSMENT — MIFFLIN-ST. JEOR: SCORE: 1817.72

## 2019-11-08 NOTE — PROGRESS NOTES
Female Physical Note    Concerns today: Concerned about weight. Has been trying to exercise. Trying to watch what she eats. No sugary beverages. Eats a lot of rice. 15-20 minutes twice a week on treadmill.     Weight gain became an issue after she had her last kid. Has not been able to lose the baby weight.     Also states that her 's license has the wrong birth date and she recently found out she was actually born in  rather than . She does not have access to her birth certificate as it is in [place].         ROS:  CONSTITUTIONAL: no fatigue, no unexpected change in weight  SKIN: no worrisome rashes, no worrisome moles, no worrisome lesions  EYES: no acute vision problems or changes  ENT: no ear problems, no mouth problems, no throat problems  RESP: no significant cough, no shortness of breath  CV: no chest pain, no palpitations, no new or worsening peripheral edema  GI: no nausea, no vomiting, no constipation, no diarrhea    Sexually Active: Yes  Sexual concerns: No   Contraception:Details: withdrawal    P: 3  Menarche: Patient unsure Patient's last menstrual period was 10/22/2019. Menses: q 30 days  Lastin-5 days,  Normal  STD History: Pos chlamydia  Last Pap Smear Date:  10/4/2019  2015: LGSIL on pap-->CIN2 on colp s/p LEEP with clear margins  2017: ASCUS on pap, neg HPV. Per guidelines, should have a pap with cotesting at 12 and 24 months. If any abnormal-->colposcopy. If neg x2-->repeat cotesting in 3 years  2018: Negative Pap and HPV testing    Patient Active Problem List   Diagnosis     Chronic hepatitis B virus infection (H)     Pruritic disorder     Papanicolaou smear of cervix with low grade squamous intraepithelial lesion (LGSIL)     Papanicolaou smear of cervix with atypical squamous cells of undetermined significance (ASC-US)     Encounter for supervision of multigravida of advanced maternal age in second trimester     Morbid obesity due to excess calories (H)       Current  Outpatient Medications   Medication Sig Dispense Refill     ibuprofen (ADVIL/MOTRIN) 600 MG tablet Take 1 tablet (600 mg) by mouth every 6 hours as needed for moderate pain 90 tablet 1     benzonatate (TESSALON) 100 MG capsule Take 1 capsule (100 mg) by mouth 3 times daily as needed for cough (Patient not taking: Reported on 11/8/2019) 42 capsule 3       Past Medical History:   Diagnosis Date     Abnormal Pap smear of cervix 10/14/14    LSIL, +HPV     Hepatitis B carrier (H)      History of malaria 2011        Family History     Problem (# of Occurrences) Relation (Name,Age of Onset)    Other - See Comments (1) Mother: chest pain       Negative family history of: Diabetes, Coronary Artery Disease, Hypertension, Breast Cancer, Colon Cancer, Prostate Cancer, Other Cancer, Cerebrovascular Disease, Asthma          Problem List Medication List and Allergy List were reviewed.    Patient is an established patient of this clinic..    Social History     Tobacco Use     Smoking status: Former Smoker     Years: 5.00     Smokeless tobacco: Never Used   Substance Use Topics     Alcohol use: Yes     Alcohol/week: 0.0 standard drinks     Comment: Occasional       Children ? yes 3 children    Has anyone hurt you physically, for example by pushing, hitting, slapping or kicking you or forcing you to have sex? Denies  Do you feel threatened or controlled by a partner, ex-partner or anyone in your life? Denies    RISK BEHAVIORS AND HEALTHY HABITS:  Tobacco Use/Smoking: sometimes smokes marijuana   Illicit Drug Use: None  Do you use alcohol? Yes  Number of drinks per day occasional  Number of drinking days a week 1-2  Diet (5-7 servings of fruits/veg daily): Yes   Exercise (30 min accumulated most days):No  Dental Care: Yes   Calcium 1500 mg/d:  Yes  Seat Belt Use: Yes     Pap/HPV cotest every 5 years for women 30-65   Recommended and patient accepted testing.  CV Risk based on Pooled Cohort Risk:Not applicable   Pooled Cohort  Risk Calculator    Immunization History   Administered Date(s) Administered     Influenza (IIV3) PF 12/09/2011, 11/01/2013     Influenza Vaccine IM > 6 months Valent IIV4 10/15/2014, 10/23/2015, 09/28/2016, 10/27/2017, 10/04/2018     Mantoux Tuberculin Skin Test 03/21/2014     TDAP Vaccine (Boostrix) 09/28/2016     Tdap (Adacel,Boostrix) 08/09/2012    Reviewed Immunization Record Today    EXAMINATION:   /88   Pulse 74   Resp 20   Wt 116.1 kg (256 lb)   LMP 10/22/2019   SpO2 99%   BMI 44.25 kg/m    GENERAL: healthy, alert and no distress  EYES: Eyes grossly normal to inspection, extraocular movements - intact, and PERRL  HENT: ear canals- normal; TMs- normal; Nose- normal; Mouth- no ulcers, no lesions  NECK: no tenderness, no adenopathy, no asymmetry, no masses, no stiffness; thyroid- normal to palpation  RESP: lungs clear to auscultation - no rales, no rhonchi, no wheezes  CV: regular rates and rhythm, normal S1 S2, no S3 or S4 and no murmur, no click or rub -  ABDOMEN: soft, no tenderness, no  hepatosplenomegaly, no masses, normal bowel sounds  MS: extremities- no gross deformities noted, no edema  SKIN: no suspicious lesions, no rashes  NEURO: strength and tone- normal, sensory exam- grossly normal, mentation- intact, speech- normal, reflexes- symmetric  BACK: no CVA tenderness, no paralumbar tenderness  - female: cervix- normal, adnexae- normal; uterus- normal, no masses, no discharge  PSYCH: Alert and oriented times 3; speech- coherent , normal rate and volume; able to articulate logical thoughts, able to abstract reason, no tangential thoughts, no hallucinations or delusions, affect- normal  LYMPHATICS: ant. cervical- normal, post. cervical- normal, axillary- normal, supraclavicular- normal    ASSESSMENT/PLAN:  1. Routine general medical examination at a health care facility   2. Screening for cervical cancer  Concerns today include: morbid obesity. See below. Patient would like diabetes screening  as well. Counseled on need for contraception however declines at this time. Will continue with condoms. Per patient's history she is >40 as she states she was actually born in 1977. She is not able to provide documentation for this (birth certificate is in [place]) so even though she would meet requirement for mammo screening it will likely not be covered by insurance. Will plan to perform mammo screening this next August when she is 40 according to our records. Discussed with patient that she could pay out of pocket for the screening but she would like to defer this until 8/2020.  Hx of ASCUS  2015: LGSIL on pap-->CIN2 on colp s/p LEEP with clear margins  2017: ASCUS on pap, neg HPV. Per guidelines, should have a pap with cotesting at 12 and 24 months. If any abnormal-->colposcopy. If neg x2-->repeat cotesting in 3 years  2018: Negative Pap and HPV testing  Today is 24 months out from ASCUS so per guidelines will obtain both PAP and HPV. If abnormal will proceed with colposcopy. If negative will repeat cotesting in 3 years.   - [facility] Cytology ([facility])  - Hemoglobin A1c ([facility])    3. Chronic viral hepatitis B without delta agent and without coma (H)  She states the Hep B is in remission. Most recent scanned document from [facility] (in 2014) requested follow-up in 6 months she has not seen them since. CMP within normal limits at CPE 1 year ago.   - GASTROENTEROLOGY ADULT REF CONSULT ONLY    4. Class 3 severe obesity due to excess calories without serious comorbidity with body mass index (BMI) of 45.0 to 49.9 in adult (H)  Has not been able to lose baby weight. Would like to try medication management as has not had success with diet/exercise. We discussed healthy eating habits including, low carb/high protein meals, no sugary beverages, less sweets and exercise habits including 20 minutes of cardio exercise per day. Will also refer to bariatric clinic for medication weight management.   - BARIATRIC ADULT REFERRAL;  Future

## 2019-11-08 NOTE — PATIENT INSTRUCTIONS
1. Pap test and HPV testing today we will call you with the results.  2. Lab work today  3. Please bring in your birth certificate at your next visit.       Preventive Health Recommendations  Female Ages 26 - 39  Yearly exam:   See your health care provider every year in order to    Review health changes.     Discuss preventive care.      Review your medicines if you your doctor has prescribed any.    Until age 30: Get a Pap test every three years (more often if you have had an abnormal result).    After age 30: Talk to your doctor about whether you should have a Pap test every 3 years or have a Pap test with HPV screening every 5 years.   You do not need a Pap test if your uterus was removed (hysterectomy) and you have not had cancer.  You should be tested each year for STDs (sexually transmitted diseases), if you're at risk.   Talk to your provider about how often to have your cholesterol checked.  If you are at risk for diabetes, you should have a diabetes test (fasting glucose).  Shots: Get a flu shot each year. Get a tetanus shot every 10 years.   Nutrition:     Eat at least 5 servings of fruits and vegetables each day.    Eat whole-grain bread, whole-wheat pasta and brown rice instead of white grains and rice.    Get adequate Calcium and Vitamin D.     Lifestyle    Exercise at least 150 minutes a week (30 minutes a day, 5 days of the week). This will help you control your weight and prevent disease.    Limit alcohol to one drink per day.    No smoking.     Wear sunscreen to prevent skin cancer.    See your dentist every six months for an exam and cleaning.  Referral for :     Gastroenterology   LOCATION/PLACE/Provider :    [facility]  DATE & TIME :    Dec. 11th at 7:40 am   PHONE :     [phone]  FAX :    [phone]  Appointment made by clinic staff/:    [place]    Unable to schedule her mammogram as her insurance wouldn't cover it till she is 40 years of age and also the bariatric location  in [place] (patient's preference) is on hold for scheduling until they are caught up on their surgeries hoping to have them caught up by the end of this year. Location will not reach out to her, we will have to assist her or patient will have to call.  Referrals all have been faxed.

## 2019-11-08 NOTE — PROGRESS NOTES
Preceptor Attestation:   Patient seen, evaluated and discussed with the resident.   I have verified the content of the note, which accurately reflects my assessment of the patient and the plan of care.  Supervising Physician:Bernabe Willis MD  [facility]

## 2019-11-11 ENCOUNTER — AMBULATORY - HEALTHEAST (OUTPATIENT)
Dept: SURGERY | Facility: CLINIC | Age: 39
End: 2019-11-11

## 2019-11-11 DIAGNOSIS — E66.813 CLASS 3 SEVERE OBESITY DUE TO EXCESS CALORIES WITH BODY MASS INDEX (BMI) OF 45.0 TO 49.9 IN ADULT (H): ICD-10-CM

## 2019-11-11 DIAGNOSIS — E66.01 CLASS 3 SEVERE OBESITY DUE TO EXCESS CALORIES WITH BODY MASS INDEX (BMI) OF 45.0 TO 49.9 IN ADULT (H): ICD-10-CM

## 2019-11-18 ENCOUNTER — RECORDS - HEALTHEAST (OUTPATIENT)
Dept: ADMINISTRATIVE | Facility: OTHER | Age: 39
End: 2019-11-18

## 2019-11-18 LAB
CYTOLOGY CVX/VAG DOC THIN PREP: NORMAL
HIGH RISK?: YES
HPV REFLEX?: NORMAL
LAB AP ABNORMAL BLEEDING: NO
LAB AP BIRTH CONTROL/HORMONES: NORMAL
LAB AP CERVICAL APPEARANCE: NORMAL
LAB AP PATIENT STATUS: NO
LAB AP PREVIOUS ABNL: 2017
LAB AP PREVIOUS NORMAL: NORMAL
LAST MENS PERIOD: NORMAL
PATH REPORT.COMMENTS IMP SPEC: NORMAL
PATH REPORT.COMMENTS IMP SPEC: NORMAL
SPECIMEN ADEQUACY:: NORMAL

## 2019-11-18 NOTE — RESULT ENCOUNTER NOTE
Patient calls back and results given to her over the phone.   ~ Oc GUTIERRES (Chrissi) CMA [facility]

## 2019-11-30 ENCOUNTER — HOSPITAL ENCOUNTER (OUTPATIENT)
Dept: MAMMOGRAPHY | Facility: CLINIC | Age: 39
Discharge: HOME | End: 2019-11-30
Attending: FAMILY MEDICINE

## 2019-11-30 DIAGNOSIS — Z12.31 VISIT FOR SCREENING MAMMOGRAM: ICD-10-CM

## 2019-12-11 ENCOUNTER — ALLIED HEALTH/NURSE VISIT (OUTPATIENT)
Dept: FAMILY MEDICINE | Facility: CLINIC | Age: 39
End: 2019-12-11
Payer: COMMERCIAL

## 2019-12-11 DIAGNOSIS — Z23 NEED FOR PROPHYLACTIC VACCINATION AND INOCULATION AGAINST INFLUENZA: Primary | ICD-10-CM

## 2021-01-14 ENCOUNTER — OFFICE VISIT (OUTPATIENT)
Dept: FAMILY MEDICINE | Facility: CLINIC | Age: 41
End: 2021-01-14
Payer: COMMERCIAL

## 2021-01-14 VITALS
RESPIRATION RATE: 18 BRPM | OXYGEN SATURATION: 96 % | DIASTOLIC BLOOD PRESSURE: 103 MMHG | WEIGHT: 266 LBS | SYSTOLIC BLOOD PRESSURE: 148 MMHG | HEIGHT: 65 IN | HEART RATE: 77 BPM | BODY MASS INDEX: 44.32 KG/M2

## 2021-01-14 DIAGNOSIS — R03.0 ELEVATED BLOOD PRESSURE READING WITHOUT DIAGNOSIS OF HYPERTENSION: ICD-10-CM

## 2021-01-14 DIAGNOSIS — F32.1 CURRENT MODERATE EPISODE OF MAJOR DEPRESSIVE DISORDER WITHOUT PRIOR EPISODE (H): Primary | ICD-10-CM

## 2021-01-14 DIAGNOSIS — T74.91XA VICTIM OF INTIMATE PARTNER ABUSE: ICD-10-CM

## 2021-01-14 LAB
BUN SERPL-MCNC: 13 MG/DL (ref 5–24)
CALCIUM SERPL-MCNC: 8.9 MG/DL (ref 8.5–10.4)
CHLORIDE SERPLBLD-SCNC: 105 MMOL/L (ref 94–109)
CHOLEST SERPL-MCNC: 155 MG/DL
CHOLEST/HDLC SERPL: 3.6 RATIO
CO2 SERPL-SCNC: 27 MMOL/L (ref 20–32)
CREAT SERPL-MCNC: 0.5 MG/DL (ref 0.6–1.3)
EGFR CALCULATED (BLACK REFERENCE): >90 ML/MIN
EGFR CALCULATED (NON BLACK REFERENCE): >90 ML/MIN
GLUCOSE SERPL-MCNC: 87 MG/DL (ref 60–109)
HDLC SERPL-MCNC: 43 MG/DL
LDLC SERPL CALC-MCNC: 93 MG/DL (ref 0–99)
POTASSIUM SERPL-SCNC: 4.1 MMOL/L (ref 3.4–5.3)
SODIUM SERPL-SCNC: 136 MMOL/L (ref 133–144)
TRIGL SERPL-MCNC: 95 MG/DL
VLDL-CHOLESTEROL: 19 MG/DL (ref 7–32)

## 2021-01-14 PROCEDURE — 80048 BASIC METABOLIC PNL TOTAL CA: CPT | Performed by: STUDENT IN AN ORGANIZED HEALTH CARE EDUCATION/TRAINING PROGRAM

## 2021-01-14 PROCEDURE — 36415 COLL VENOUS BLD VENIPUNCTURE: CPT | Performed by: STUDENT IN AN ORGANIZED HEALTH CARE EDUCATION/TRAINING PROGRAM

## 2021-01-14 PROCEDURE — 80061 LIPID PANEL: CPT | Performed by: STUDENT IN AN ORGANIZED HEALTH CARE EDUCATION/TRAINING PROGRAM

## 2021-01-14 PROCEDURE — 99215 OFFICE O/P EST HI 40 MIN: CPT | Mod: GC | Performed by: STUDENT IN AN ORGANIZED HEALTH CARE EDUCATION/TRAINING PROGRAM

## 2021-01-14 ASSESSMENT — MIFFLIN-ST. JEOR: SCORE: 1877.45
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
## 2021-01-14 NOTE — PATIENT INSTRUCTIONS
Counseling/Therapy  [facility]. (Free): Only doing phone counseling.  Monday, Wednesday, Friday afternoons from 1 - 3  Monday through Thursday evenings from 5 - 8:30 (times that accommodate all three clinic sites).   Clients should call Walk-In at [phone] x [phone] to get voice instructions and have pencil and paper ready to take the Zoom phone number and meeting CODE of the clinic they want!    Crisis numbers- available 24 hours a day  [facility] Multilingual Crisis Line:  [phone]  Yog koj xav tau neeg pab koj es hais lub hmoob no hu tammy tus xov tooj no ua ntej.    [facility] for Adult Mental [facility]: [phone]     Crisis Text Line is available for free, 24/7 by texting [place] to 006303    National Suicide Prevention Hotline: 1-428.930.5410    [employer] (GLBTQ+ youth): 1-762.349.3171 or text: 335272    Deaf or hard of hearing:  Video relay Service - Dial [phone]  TTY - Dial [phone]  Voice/Caption Phone - Dial [phone]    Apps  Journey- free journaling chente (with in chente purchases). Allows images and videos    Meditation- Calm, Headspace  (both free and have in chente purchases). Free version gets you daily meditation.    Coping skills/resilience building-   Clear Free (free anxiety coping skills)  Mindshift (free therapy strategies)  Super Better (free resilience and goal achievement help)  What's up (free coping strategies for anger, stress, depression, anxiety)

## 2021-03-04 ENCOUNTER — OFFICE VISIT (OUTPATIENT)
Dept: FAMILY MEDICINE | Facility: CLINIC | Age: 41
End: 2021-03-04
Payer: COMMERCIAL

## 2021-03-04 ENCOUNTER — DOCUMENTATION ONLY (OUTPATIENT)
Dept: PSYCHOLOGY | Facility: CLINIC | Age: 41
End: 2021-03-04

## 2021-03-04 VITALS
WEIGHT: 268 LBS | HEART RATE: 83 BPM | RESPIRATION RATE: 20 BRPM | OXYGEN SATURATION: 100 % | BODY MASS INDEX: 44.6 KG/M2 | DIASTOLIC BLOOD PRESSURE: 73 MMHG | SYSTOLIC BLOOD PRESSURE: 108 MMHG

## 2021-03-04 DIAGNOSIS — Z02.9 ADMINISTRATIVE ENCOUNTER: Primary | ICD-10-CM

## 2021-03-04 PROBLEM — F32.1 CURRENT MODERATE EPISODE OF MAJOR DEPRESSIVE DISORDER WITHOUT PRIOR EPISODE (H): Status: ACTIVE | Noted: 2021-03-04

## 2021-03-04 PROCEDURE — 99212 OFFICE O/P EST SF 10 MIN: CPT | Mod: GC | Performed by: STUDENT IN AN ORGANIZED HEALTH CARE EDUCATION/TRAINING PROGRAM

## 2021-03-04 NOTE — PROGRESS NOTES
I have personally reviewed the history and examination as documented by Dr. Dave.  I was present during key portions of the visit and agree with the assessment and plan as documented for 40 yr old female here to begin N648 certification process for citizenship. Will refer to appropriate provider.    Jerrod Chery MD  March 4, 2021  10:04 AM

## 2021-03-04 NOTE — PROGRESS NOTES
This is a referral for neuropsych testing to provide further documentation for patient's eligibility for [facility] citizenship exam waiver due to inability to retain and learn civics and history. Please see attached letter from PCP.     Patient can be seen at:    [place] [id number]    PHONE: [phone]  FAX: [phone]    ===View-only below this line===  ----- Message -----  From: Aria Dave MD  Sent: 3/4/2021   9:51 AM CST  To:  Mental Health  Subject: Order for GRISELFOLRAYMOND Recinos         [id number]               : 1980  F     [address]                          PCP: [id number], RISA*  [facility] [address]                                 CTR: [facility]        Name: BERNY RECINOS Date: 3/4/2021    Home: [phone]    [facility]              [facility]    Plan:               [facility]  Sponsor Code:       1402  Subscriber ID:      [id number]  Subscriber Name:    JESUSITA RECINOSRAYMOND ACUNA  Subscriber Address: [address] [place] [address] [address] [place] [id number]    Effective From:     19  Effective To:         Group Number:       [id number]  Group Name  : Not Available      Date       Provider                   Department   Center         3/4/2021   [id number] DAVE   [facility]    Order Date:3/4/2021  Ordering User:ARIA DAVE [[email or web address]]  Encounter Provider:Aria Dave MD [[id number]]  Authorizing Provider: Jerrod Chery MD [[id number]]  Department:[facility][[id number]]    Ordering Provider NPI: [id number]  Jerrod [facility]~[address], [place]   [address]  Phone: [phone]          Procedure Requested    9035.005 [facility] REFER* [#[id number]]      Priority: Routine  Class: External referral      Comment: needed: No               Language: English    Associated  Diagnoses      Z02.9 Administrative encounter      Reason for Referral:  Other - use free text box Cmt: N648 eligibility        Adult or Child/Adolescent:  Adult         Type of Referral (Indicate al  l that apply):  Neuropsychological testing -                                                   for assessment of cognitive                                                   function         Currently receiving mental health services (if 'Yes', use free berna box -   what services and why today's referral?)  Kate Hamilton         [id number]               : 1980  F     [address]                          PCP: ANNE BRANHAM*  [facility] [address]                                 CTR: [facility]      Comments

## 2021-03-04 NOTE — PATIENT INSTRUCTIONS
Please cancel your upcoming examination while you are undergoing the evaluation for N648 waiver eligibility. Please reach out to one of the legal services providers below for assistance with this process. We have placed a referral for a mental health professional to evaluate you for a learning/cognitive disability that would qualify you for the N648 waiver.      [facility] Legal Services  Immigration and Education Services - [address], [address]  [place] [address] [phone] FAX  [email or web address]    Languages: Azeri, Kyrgyz, Hmong, Julián, and Frisian  Provide interpreters for other languages.    [facility] - [phone]    [facility] Main Office  [address], [address]  [place] [address]   Accessible by bus lines: 16 and 21 Monday - Friday: 9 am to 5 pm
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
## 2021-03-08 ENCOUNTER — TELEPHONE (OUTPATIENT)
Dept: FAMILY MEDICINE | Facility: CLINIC | Age: 41
End: 2021-03-08

## 2021-03-08 NOTE — TELEPHONE ENCOUNTER
"Due to patient making multiple calls to different departments to attempt get her immigration paperwork completed with a diagnosis of disability with her memory, I have reached out to provide assistance. Genaro reports she has been told she does not need Neuropsych testing for her learning disability as she spoke and has reported the disability to her PCP. Genaro reports she needs to keep this weeks appt with Dr. Morgan as immigration forms need to be completed. She reports being scheduled for immigration testing 3/15, voices concerns with not having the paperwork completed by then.  Genaro states myself and clinic are making her sad and increasing her anxiety with \"not wanting to help me\". Patient assured this in not the situation however, Dr. Tang needs the results of her Neuropsych testing to determine her disability, without this being done PCP cannot fill out the forms without supporting documentation.     Patient reports she has been in contact with Immigration  but has not received any return calls.  I attempted to review and provide additional legal resources she could try however, she declines. Patient encouraged to reach out to [facility] to see if they would be able to fit her in sooner. I offered her the opportunity to speak with managerrocío at this time.     "

## 2021-03-08 NOTE — PROGRESS NOTES
Called and informed Genaro Hamilton the referral was placed for neuropsych testing Genaro Hamilton will call back for the number to schedule.

## 2021-03-08 NOTE — TELEPHONE ENCOUNTER
Patient called clinic and spoke with (Jean Marie) wanting to know about her mental health referral for Neuropsych testing as she had not received any information. Referral placed 3/4/21. Referral, face sheet and last clinic visit noted faxed to [facility] [phone]. Out going call made to patient. Detailed voicemail message left with Neuropsych testing information and clinic contact phone number.   During charting this note patient contacted clinic due to missed call. I have asked the call center to refer the patient to check her voicemail for referral information.

## 2021-03-08 NOTE — TELEPHONE ENCOUNTER
----- Message from Aria Dave MD sent at 3/8/2021  9:59 AM CST -----  Hi Team,    I see that Genaro is scheduled to see me again on 3/10 regarding her immigration paperwork (N648 waiver). I do not think this visit is needed as I discussed with her in her last visit that I am unable to complete that paperwork as she needs further neuropsych testing to determine her eligibility for the N648 waiver. I placed a referral for her to get set up with a community provider who is better trained to assess her eligibility. Please call and reiterate this information to patient.     Dr. Brian Womack, mostly just an FYI but if there are other things I need to be doing at this point please let me know.    NELDA

## 2021-03-10 ENCOUNTER — OFFICE VISIT (OUTPATIENT)
Dept: FAMILY MEDICINE | Facility: CLINIC | Age: 41
End: 2021-03-10
Payer: COMMERCIAL

## 2021-03-10 ENCOUNTER — TELEPHONE (OUTPATIENT)
Dept: FAMILY MEDICINE | Facility: CLINIC | Age: 41
End: 2021-03-10

## 2021-03-10 VITALS
SYSTOLIC BLOOD PRESSURE: 135 MMHG | TEMPERATURE: 98.5 F | RESPIRATION RATE: 16 BRPM | DIASTOLIC BLOOD PRESSURE: 87 MMHG | BODY MASS INDEX: 44.83 KG/M2 | WEIGHT: 262.6 LBS | HEIGHT: 64 IN | HEART RATE: 83 BPM | OXYGEN SATURATION: 99 %

## 2021-03-10 DIAGNOSIS — Z02.9 ADMINISTRATIVE ENCOUNTER: Primary | ICD-10-CM

## 2021-03-10 PROCEDURE — 99212 OFFICE O/P EST SF 10 MIN: CPT | Mod: GC | Performed by: STUDENT IN AN ORGANIZED HEALTH CARE EDUCATION/TRAINING PROGRAM

## 2021-03-10 ASSESSMENT — MIFFLIN-ST. JEOR: SCORE: 1852.02

## 2021-03-10 NOTE — TELEPHONE ENCOUNTER
"Face to face meeting per provider request to assist with immigration issues. A [employer]  from [facility] was used. Please see providers clinic visit note with regards to immigration paperwork for disability.   Jemma reports she is scheduled for Neuropsych testing in \"4 months\" but this will be past her hearing date and states she has contacted immigration to reschedule but was told she could not, reports  has not been helpful. Out going conference call made to [facility] Department of [place] [employer] [phone] (Applicant ID [id number]) to discuss what could be done to assist with rescheduling. Per [facility] patient can actually reschedule at any time however, they have requested patient to call back in 2-3 hours due to their system being down. Genaro reports she may need help with the return call and has requested I contact her sister to discuss but declines to sign CHRISTIANO to speak with her. Attempted to obtain clinic information as to where she is scheduled for her Neuropsych testing to inquire about possibly getting patient in sooner. Patient reports she did not actually schedule her appointment due to it being 4 months out and states \"she hung up\" in addition she reports not reaching out to immigration to request a new appointment because she did not \"understand and was scared\". Patient has been encouraged to schedule her neuropsychology testing, contact [place] [employer] to reschedule appt and make contact with  or services for assistance.    "

## 2021-03-10 NOTE — PROGRESS NOTES
Preceptor Attestation:  Patient's case reviewed and discussed with Aria Dave MD resident and I evaluated the patient. I agree with written assessment and plan of care.  Supervising Physician:  Julito Ly MD, MD [facility]

## 2021-03-10 NOTE — LETTER
March 10, 2021      Genaro Hamilton  [address]   [place] [address]        To whom it may concern,    Genaro Hamilton is a patient of our clinic. She needs to undergo further neuropsychological testing to determine whether she has a learning disability and would qualify for the N-648 waiver. The current wait time in our community for this evaluation is about 4 months.     I recommend that she postpone her immigration/citizenship testing until after this further evaluation is complete.      Sincerely,              Aria Dave MD
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
## 2021-03-10 NOTE — PATIENT INSTRUCTIONS
Patient will call and schedule the next available appointment for neuropsych testing. This appointment may be 4 or more months out from now.     Capri (care coordinator) will look and see if there is sooner availability for neuropsych testing. She will contact patient and inform her to schedule if there is sooner availability.     Patient need to contact [employer] ([phone]) after 2 PM today (3/10/2021) to reschedule her citizenship test appointment.

## 2021-03-10 NOTE — NURSING NOTE
Due to patient being non-English speaking/uses sign language, an  was used for this visit. Only for face-to-face interpretation by an external agency, date and length of interpretation can be found on the scanned worksheet.     [facility]  Agency: Karen Roy  Language: Syriac   Telephone number: [phone]  Type of interpretation: Telephone, spoken

## 2021-09-01 ENCOUNTER — APPOINTMENT (OUTPATIENT)
Dept: RADIOLOGY | Facility: HOSPITAL | Age: 41
End: 2021-09-01
Payer: COMMERCIAL

## 2021-09-01 ENCOUNTER — HOSPITAL ENCOUNTER (EMERGENCY)
Facility: HOSPITAL | Age: 41
Discharge: HOME | End: 2021-09-01
Admitting: PHYSICIAN ASSISTANT
Payer: COMMERCIAL

## 2021-09-01 VITALS
TEMPERATURE: 97.9 F | HEART RATE: 71 BPM | OXYGEN SATURATION: 98 % | RESPIRATION RATE: 16 BRPM | BODY MASS INDEX: 45.24 KG/M2 | SYSTOLIC BLOOD PRESSURE: 159 MMHG | DIASTOLIC BLOOD PRESSURE: 93 MMHG | WEIGHT: 265 LBS | HEIGHT: 64 IN

## 2021-09-01 DIAGNOSIS — M25.562 CHRONIC PAIN OF LEFT KNEE: ICD-10-CM

## 2021-09-01 DIAGNOSIS — G89.29 CHRONIC PAIN OF LEFT KNEE: ICD-10-CM

## 2021-09-01 DIAGNOSIS — R07.89 ATYPICAL CHEST PAIN: ICD-10-CM

## 2021-09-01 LAB
ANION GAP SERPL CALCULATED.3IONS-SCNC: 6 MMOL/L (ref 5–18)
ATRIAL RATE - MUSE: 74 BPM
BUN SERPL-MCNC: 6 MG/DL (ref 8–22)
CALCIUM SERPL-MCNC: 9.4 MG/DL (ref 8.5–10.5)
CHLORIDE BLD-SCNC: 106 MMOL/L (ref 98–107)
CO2 SERPL-SCNC: 25 MMOL/L (ref 22–31)
CREAT SERPL-MCNC: 0.73 MG/DL (ref 0.6–1.1)
DIASTOLIC BLOOD PRESSURE - MUSE: NORMAL MMHG
ERYTHROCYTE [DISTWIDTH] IN BLOOD BY AUTOMATED COUNT: 13.1 % (ref 10–15)
GFR SERPL CREATININE-BSD FRML MDRD: >90 ML/MIN/1.73M2
GLUCOSE BLD-MCNC: 85 MG/DL (ref 70–125)
HCT VFR BLD AUTO: 40.6 % (ref 35–47)
HGB BLD-MCNC: 13 G/DL (ref 11.7–15.7)
INTERPRETATION ECG - MUSE: NORMAL
MAGNESIUM SERPL-MCNC: 1.9 MG/DL (ref 1.8–2.6)
MCH RBC QN AUTO: 29 PG (ref 26.5–33)
MCHC RBC AUTO-ENTMCNC: 32 G/DL (ref 31.5–36.5)
MCV RBC AUTO: 90 FL (ref 78–100)
P AXIS - MUSE: 42 DEGREES
PLATELET # BLD AUTO: 271 10E3/UL (ref 150–450)
POTASSIUM BLD-SCNC: 4.2 MMOL/L (ref 3.5–5)
PR INTERVAL - MUSE: 150 MS
QRS DURATION - MUSE: 72 MS
QT - MUSE: 382 MS
QTC - MUSE: 424 MS
R AXIS - MUSE: 25 DEGREES
RBC # BLD AUTO: 4.49 10E6/UL (ref 3.8–5.2)
SODIUM SERPL-SCNC: 137 MMOL/L (ref 136–145)
SYSTOLIC BLOOD PRESSURE - MUSE: NORMAL MMHG
T AXIS - MUSE: 36 DEGREES
TROPONIN I SERPL-MCNC: <0.01 NG/ML (ref 0–0.29)
VENTRICULAR RATE- MUSE: 74 BPM
WBC # BLD AUTO: 7.5 10E3/UL (ref 4–11)

## 2021-09-01 PROCEDURE — 83735 ASSAY OF MAGNESIUM: CPT | Performed by: PHYSICIAN ASSISTANT

## 2021-09-01 PROCEDURE — 80048 BASIC METABOLIC PNL TOTAL CA: CPT | Performed by: PHYSICIAN ASSISTANT

## 2021-09-01 PROCEDURE — 99285 EMERGENCY DEPT VISIT HI MDM: CPT | Mod: 25

## 2021-09-01 PROCEDURE — 36415 COLL VENOUS BLD VENIPUNCTURE: CPT | Performed by: PHYSICIAN ASSISTANT

## 2021-09-01 PROCEDURE — 85014 HEMATOCRIT: CPT | Performed by: PHYSICIAN ASSISTANT

## 2021-09-01 PROCEDURE — 93005 ELECTROCARDIOGRAM TRACING: CPT | Performed by: STUDENT IN AN ORGANIZED HEALTH CARE EDUCATION/TRAINING PROGRAM

## 2021-09-01 PROCEDURE — 71046 X-RAY EXAM CHEST 2 VIEWS: CPT

## 2021-09-01 PROCEDURE — 84484 ASSAY OF TROPONIN QUANT: CPT | Performed by: PHYSICIAN ASSISTANT

## 2021-09-01 ASSESSMENT — MIFFLIN-ST. JEOR: SCORE: 1852.03

## 2021-09-01 ASSESSMENT — ENCOUNTER SYMPTOMS: HEADACHES: 1

## 2021-09-01 NOTE — ED PROVIDER NOTES
EMERGENCY DEPARTMENT ENCOUNTER      NAME: Genaro Hamilton  AGE: 41 year old female  YOB: 1980  MRN: [id number]  EVALUATION DATE & TIME: No admission date for patient encounter.    PCP: Sue Thomas    ED PROVIDER: Stephanie Florian PA-C      Chief Complaint   Patient presents with     Multiple Complaints         FINAL IMPRESSION:  1. Atypical chest pain    2. Chronic pain of left knee          MEDICAL DECISION MAKING:    Pertinent Labs & Imaging studies reviewed. (See chart for details)  41 year old female with a pertinent history of chronic hepatitis B, obesity, MDD presents to the Emergency Department for evaluation of chest pain for a few weeks, worse with palpation of chest wall and with movement of her left arm. Not worsened by exertion and no shortness of breath, cough, fevers or pleuritic pain. She then began exercising as she was concerned it was her heart and she is aware she is over weight. Since starting working out her left knee has been bothering her and she has soreness in her left forearm muscles. She denies any falls or known injuries. Has had problems with this knee before, specifically when she was pregnant.     Vitals reviewed and notable for elevated blood pressure. On exam she is well appearing and in no acute distress. She has reproducible left anterior chest wall pain to palpation. No overlying skin changes or palpable deformity. Lungs are CTAB. Normal S1/S2. Symmetric peripheral pulses. Additionally she has pain with ROM of left knee. No appreciable swelling. No mass to posterior knee, no laxity, no calf swelling or tenderness. She also has some mild tenderness to palpation of left forearm musculature. 5/5 strength of bilateral upper and lower extremities. Differential diagnosis includes but not limited to ACS, PE, pneumonia, musculoskeletal, costochondritis, pleurisy. For her knee pain, considered joint effusion, ligamentous injury, arthritis, septic joint.     EKG without  ischemic changes. Troponin is not elevated. Heart score low risk. Symptoms ongoing for about two weeks thus delta troponin not indicated. Very low suspicion for ACS. PERC negative, Wells low risk with very low suspicion for PE. CXR unremarkable. Suspect musculoskeletal etiology given reproducible to palpation. As far as her knee, I do not see any utility in emergent xrays. No falls, she is ambulatory, no tenderness to palpation, swelling or erythema. Nothing to suggest septic joint, dislocation, fracture. She is aware it is likely secondary to her weight and is trying to exercise more. Encouraged her to use ACE bandage for compression, ice, elevate when resting. Discussed low impact exercises including stationary bicycle, swimming, etc. If she continues to have pain, encouraged her to talk with her PCP about potential imaging/injections/etc. Her arm pain is clearly musculoskeletal and likely secondary to recent exercising. Encouraged her to take tylenol and ibuprofen as needed for her chest wall, arm and knee pain. We discussed return precautions and patient discharged home in stable condition.     0 minutes of critical care time     ED COURSE:  11:23 AM I met with the patient, obtained history, performed an initial exam, and discussed options and plan for diagnostics and treatment here in the ED. PPE worn throughout all patient encounters includes: surgical mask, gloves.   12:49 PM I called lab to check the status on the CBC.  12:53 PM I rechecked and updated the patient. Discussed the lab and imaging results. We discussed the plan for discharge and the patient is agreeable. Reviewed supportive cares, symptomatic treatment, outpatient follow up, and reasons to return to the Emergency Department. Patient to be discharged by ED RN.      At the conclusion of the encounter I discussed the results of all of the tests and the disposition. The questions were answered. The patient acknowledged understanding and was  "agreeable with the care plan.     MEDICATIONS GIVEN IN THE EMERGENCY:  Medications - No data to display    NEW PRESCRIPTIONS STARTED AT TODAY'S ER VISIT  Discharge Medication List as of 9/1/2021  1:03 PM               =================================================================    HPI:    Patient information was obtained from: Patient    Use of Interpretor: N/A       Genaro KALPANA Hamilton is a 41 year old female with a pertinent history of chronic hepatitis B, obesity, MDD, who presents to this ED by private car for evaluation of chest pain, extremity pain.    Patient notes gradual onset of left-sided chest pains that have been present for \"a while.\" Her pain is worsened when she palpates the area and with movement of her left arm. She does not have increased chest pain with exertion and no associated shortness of breath. This pain concerned her, so she began exercising about two weeks ago. She also endorses chronic, intermittent left knee pain. Since exercising, her chest pain has improved, however her knee pain has worsened and it is now more swollen. She reports onset of pain in her left forearm after she began exercising. Patient also endorses a gradual onset left-sided headache for the past few days. She has not taken anything for her symptoms. She denies any fevers, chills, vision changes, extremity weakness, paresthesias. She denies cardiac history. No recent falls. Denies lower leg swelling, calf tenderness, or any additional symptoms at this time. Patient is not on any hormonal birth control, no history of VTEs.    REVIEW OF SYSTEMS:  Review of Systems   Constitutional: Negative for chills and fever.   Respiratory: Negative for shortness of breath.    Cardiovascular: Positive for chest pain. Negative for leg swelling.   Gastrointestinal: Negative for abdominal pain, diarrhea, nausea and vomiting.   Musculoskeletal: Positive for arthralgias (left knee pain) and myalgias (left forearm). Negative for gait " problem, neck pain and neck stiffness.   Skin: Negative for rash.   Neurological: Positive for headaches. Negative for dizziness, weakness, light-headedness and numbness.   All other systems reviewed and are negative.       PAST MEDICAL HISTORY:  Past Medical History:   Diagnosis Date     Abnormal Pap smear of cervix 10/14/2014    LSIL, +HPV     Hepatitis B carrier (H)      History of malaria 2011       PAST SURGICAL HISTORY:  Past Surgical History:   Procedure Laterality Date     LEEP TX, CERVICAL             CURRENT MEDICATIONS:    No current facility-administered medications for this encounter.    Current Outpatient Medications:      sertraline (ZOLOFT) 50 MG tablet, Take 1 tablet (50 mg) by mouth daily, Disp: 90 tablet, Rfl: 1      ALLERGIES:  Allergies   Allergen Reactions     Nka [No Known Allergies]        FAMILY HISTORY:  Family History   Problem Relation Age of Onset     Other - See Comments Mother         chest pain     Diabetes No family hx of      Coronary Artery Disease No family hx of      Hypertension No family hx of      Breast Cancer No family hx of      Colon Cancer No family hx of      Prostate Cancer No family hx of      Other Cancer No family hx of      Cerebrovascular Disease No family hx of      Asthma No family hx of        SOCIAL HISTORY:   Social History     Socioeconomic History     Marital status:      Spouse name: Julito Hamilton     Number of children: 3     Years of education: 12     Highest education level: None   Occupational History     Occupation: every other wkend data support   Tobacco Use     Smoking status: Former Smoker     Years: 5.00     Smokeless tobacco: Never Used   Substance and Sexual Activity     Alcohol use: Yes     Alcohol/week: 0.0 standard drinks     Comment: Occasional     Drug use: No     Sexual activity: Yes     Partners: Male     Comment: no contraception, she is ok to become pregnant    Other Topics Concern     Parent/sibling w/ CABG, MI or angioplasty  "before 65F 55M? Not Asked   Social History Narrative     None     Social Determinants of Health     Financial Resource Strain:      Difficulty of Paying Living Expenses:    Food Insecurity:      Worried About Running Out of Food in the Last Year:      Ran Out of Food in the Last Year:    Transportation Needs:      Lack of Transportation (Medical):      Lack of Transportation (Non-Medical):    Physical Activity:      Days of Exercise per Week:      Minutes of Exercise per Session:    Stress:      Feeling of Stress :    Social Connections:      Frequency of Communication with Friends and Family:      Frequency of Social Gatherings with Friends and Family:      Attends Baptism Services:      Active Member of Clubs or Organizations:      Attends Club or Organization Meetings:      Marital Status:    Intimate Partner Violence:      Fear of Current or Ex-Partner:      Emotionally Abused:      Physically Abused:      Sexually Abused:        VITALS:  Patient Vitals for the past 24 hrs:   BP Temp Temp src Pulse Resp SpO2 Height Weight   09/01/21 1019 (!) 159/93 97.9  F (36.6  C) Temporal 71 16 98 % 1.626 m (5' 4\") 120.2 kg (265 lb)       PHYSICAL EXAM    Constitutional: Morbidly obese. Well developed, NAD  HENT: Normocephalic, Atraumatic, Bilateral external ears normal, Oropharynx normal, mucous membranes moist, Nose normal.   Neck:  Normal range of motion, No tenderness, Supple, No stridor.  No nuchal rigidity.  Eyes: PERRL, EOMI, Conjunctiva normal, No discharge.   Respiratory: Normal breath sounds, No respiratory distress, No wheezing, Speaks full sentences easily. No cough.    Cardiovascular: Normal heart rate, Regular rhythm,  No murmurs, No rubs, No gallops. Left anterior chest wall pain that is reproducible to palpation. No palpable deformity, crepitus or overlying skin changes.    GI: Soft, No tenderness, No masses, No flank tenderness. No rebound or guarding.  Musculoskeletal: Mild pain with ROM of left knee, no " appreciable edema. No obvious joint effusion. Appears symmetric when compared to right. No laxity. No tenderness along medial or lateral jiont line, patella, or tibial tuberosity. No tenderness or mass appreciated to posterior knee. Patellar tendon intact. No tenderness in thigh, calf or tib/fib. No overlying skin changes. Tenderness to palpation of the left forearm musculature. 2+ DP and radial pulses. No edema.  No cyanosis, No clubbing. Good range of motion in all major joints. No major deformities noted. No tenderness of the CTLS spine.    Integument: Warm, Dry, No erythema, No rash. No petechiae.  Neurologic: Alert & oriented x 3, Normal motor function, Normal sensory function, No focal deficits noted. Normal gait.    Psychiatric: Affect normal, Judgment normal, Mood normal. Cooperative.       LAB:  All pertinent labs reviewed and interpreted.  Recent Results (from the past 24 hour(s))   ECG 12-LEAD WITH MUSE ([facility])    Collection Time: 09/01/21 10:18 AM   Result Value Ref Range    Systolic Blood Pressure  mmHg    Diastolic Blood Pressure  mmHg    Ventricular Rate 74 BPM    Atrial Rate 74 BPM    CT Interval 150 ms    QRS Duration 72 ms     ms    QTc 424 ms    P Axis 42 degrees    R AXIS 25 degrees    T Axis 36 degrees    Interpretation ECG       Sinus rhythm  Normal ECG  When compared with ECG of 06-JAN-2012 21:39,  No significant change was found  Confirmed by SEE ED PROVIDER NOTE FOR, ECG INTERPRETATION ([id number]),  GUILLERMO JONES ([id number]) on 9/1/2021 11:56:19 AM     CBC (+ platelets, no diff)    Collection Time: 09/01/21 11:44 AM   Result Value Ref Range    WBC Count 7.5 4.0 - 11.0 10e3/uL    RBC Count 4.49 3.80 - 5.20 10e6/uL    Hemoglobin 13.0 11.7 - 15.7 g/dL    Hematocrit 40.6 35.0 - 47.0 %    MCV 90 78 - 100 fL    MCH 29.0 26.5 - 33.0 pg    MCHC 32.0 31.5 - 36.5 g/dL    RDW 13.1 10.0 - 15.0 %    Platelet Count 271 150 - 450 10e3/uL   Basic metabolic panel    Collection Time: 09/01/21 11:44 AM    Result Value Ref Range    Sodium 137 136 - 145 mmol/L    Potassium 4.2 3.5 - 5.0 mmol/L    Chloride 106 98 - 107 mmol/L    Carbon Dioxide (CO2) 25 22 - 31 mmol/L    Anion Gap 6 5 - 18 mmol/L    Urea Nitrogen 6 (L) 8 - 22 mg/dL    Creatinine 0.73 0.60 - 1.10 mg/dL    Calcium 9.4 8.5 - 10.5 mg/dL    Glucose 85 70 - 125 mg/dL    GFR Estimate >90 >60 mL/min/1.73m2   Magnesium    Collection Time: 09/01/21 11:44 AM   Result Value Ref Range    Magnesium 1.9 1.8 - 2.6 mg/dL   Troponin I (now)    Collection Time: 09/01/21 11:44 AM   Result Value Ref Range    Troponin I <0.01 0.00 - 0.29 ng/mL         RADIOLOGY:  Reviewed all pertinent imaging. Please see official radiology report.  Chest XR,  PA & LAT   Final Result   IMPRESSION: Negative chest.          EKG:    Performed at: 1018    Impression: Normal sinus rhythm    Rate: 74  Rhythm: Sinus  Axis: 25  OK Interval: 150  QRS Interval: 72  QTc Interval: 424  ST Changes: No acute ST elevations or depressions.  Comparison: When compared with ECG of 01-DEC-2016, no significant change.    I have independently reviewed and interpreted the EKG(s) documented above.  Reviewed with Dr. Crook.      I, Francy Rahman, am serving as a scribe to document services personally performed by Stephanie Florian PA-C based on my observation and the provider's statements to me. IStephanie PA-C attest that Francy Rahman is acting in a scribe capacity, has observed my performance of the services and has documented them in accordance with my direction.    Stephanie Florian PA-C  Emergency Medicine  [facility]  9/1/2021     Stephanie Florian PA-C  09/02/21 [id number]

## 2021-09-01 NOTE — ED TRIAGE NOTES
Patient presents to ED for evaluation of multiple complaints, including arm pain, leg pain and chest pain for 2 weeks. Son is also checking in. Went to clinic and showed up without appointment, told had to come here. Also headache noted, 8/10.

## 2021-09-01 NOTE — DISCHARGE INSTRUCTIONS
Work up today is reassuring. Please follow up with your primary care provider next week for further evaluation of your chronic knee pain. Return with new or worsening symptoms.

## 2021-09-02 ASSESSMENT — ENCOUNTER SYMPTOMS
LIGHT-HEADEDNESS: 0
NECK PAIN: 0
ABDOMINAL PAIN: 0
NAUSEA: 0
MYALGIAS: 1
DIZZINESS: 0
NUMBNESS: 0
ARTHRALGIAS: 1
CHILLS: 0
VOMITING: 0
DIARRHEA: 0
FEVER: 0
SHORTNESS OF BREATH: 0
NECK STIFFNESS: 0
WEAKNESS: 0

## 2021-12-03 ENCOUNTER — IMMUNIZATION (OUTPATIENT)
Dept: FAMILY MEDICINE | Facility: CLINIC | Age: 41
End: 2021-12-03
Payer: COMMERCIAL

## 2021-12-03 ENCOUNTER — ALLIED HEALTH/NURSE VISIT (OUTPATIENT)
Dept: FAMILY MEDICINE | Facility: CLINIC | Age: 41
End: 2021-12-03
Payer: COMMERCIAL

## 2021-12-03 DIAGNOSIS — Z23 NEED FOR PROPHYLACTIC VACCINATION AND INOCULATION AGAINST INFLUENZA: Primary | ICD-10-CM

## 2021-12-03 PROCEDURE — 91306 COVID-19,PF,MODERNA (18+ YRS BOOSTER .25ML): CPT

## 2021-12-03 PROCEDURE — 90686 IIV4 VACC NO PRSV 0.5 ML IM: CPT

## 2021-12-03 PROCEDURE — 90471 IMMUNIZATION ADMIN: CPT

## 2021-12-03 PROCEDURE — 0064A COVID-19,PF,MODERNA (18+ YRS BOOSTER .25ML): CPT

## 2022-01-07 ENCOUNTER — OFFICE VISIT (OUTPATIENT)
Dept: FAMILY MEDICINE | Facility: CLINIC | Age: 42
End: 2022-01-07
Payer: COMMERCIAL

## 2022-01-07 ENCOUNTER — PATIENT OUTREACH (OUTPATIENT)
Dept: FAMILY MEDICINE | Facility: CLINIC | Age: 42
End: 2022-01-07

## 2022-01-07 VITALS
RESPIRATION RATE: 18 BRPM | WEIGHT: 268 LBS | TEMPERATURE: 98 F | HEART RATE: 72 BPM | BODY MASS INDEX: 44.65 KG/M2 | SYSTOLIC BLOOD PRESSURE: 144 MMHG | OXYGEN SATURATION: 97 % | HEIGHT: 65 IN | DIASTOLIC BLOOD PRESSURE: 88 MMHG

## 2022-01-07 DIAGNOSIS — Z00.00 ROUTINE GENERAL MEDICAL EXAMINATION AT A HEALTH CARE FACILITY: Primary | ICD-10-CM

## 2022-01-07 DIAGNOSIS — Z12.31 ENCOUNTER FOR SCREENING MAMMOGRAM FOR BREAST CANCER: ICD-10-CM

## 2022-01-07 DIAGNOSIS — E66.01 CLASS 3 SEVERE OBESITY DUE TO EXCESS CALORIES WITHOUT SERIOUS COMORBIDITY WITH BODY MASS INDEX (BMI) OF 45.0 TO 49.9 IN ADULT (H): ICD-10-CM

## 2022-01-07 DIAGNOSIS — I10 PRIMARY HYPERTENSION: ICD-10-CM

## 2022-01-07 DIAGNOSIS — T74.91XA VICTIM OF INTIMATE PARTNER ABUSE: ICD-10-CM

## 2022-01-07 DIAGNOSIS — E66.813 CLASS 3 SEVERE OBESITY DUE TO EXCESS CALORIES WITHOUT SERIOUS COMORBIDITY WITH BODY MASS INDEX (BMI) OF 45.0 TO 49.9 IN ADULT (H): ICD-10-CM

## 2022-01-07 DIAGNOSIS — F32.1 CURRENT MODERATE EPISODE OF MAJOR DEPRESSIVE DISORDER WITHOUT PRIOR EPISODE (H): ICD-10-CM

## 2022-01-07 DIAGNOSIS — B18.1 CHRONIC HEPATITIS B VIRUS INFECTION (H): ICD-10-CM

## 2022-01-07 LAB
AFP SERPL-MCNC: <2 NG/ML
ALBUMIN SERPL-MCNC: 3.8 G/DL (ref 3.4–5)
ALP SERPL-CCNC: 48 U/L (ref 40–150)
ALT SERPL W P-5'-P-CCNC: 24 U/L
ANION GAP SERPL CALCULATED.3IONS-SCNC: 7 MMOL/L (ref 3–14)
AST SERPL W P-5'-P-CCNC: 27 U/L (ref 0–45)
BILIRUB SERPL-MCNC: 0.7 MG/DL (ref 0.2–1.3)
BUN SERPL-MCNC: 8 MG/DL (ref 7–30)
CALCIUM SERPL-MCNC: 9 MG/DL (ref 8.5–10.1)
CHLORIDE BLD-SCNC: 108 MMOL/L
CO2 SERPL-SCNC: 27 MMOL/L (ref 20–32)
CREAT SERPL-MCNC: 0.7 MG/DL
GFR SERPL CREATININE-BSD FRML MDRD: >90 ML/MIN/1.73M2
GLUCOSE BLD-MCNC: 93 MG/DL (ref 70–99)
POTASSIUM BLD-SCNC: 4 MMOL/L (ref 3.4–5.3)
PROT SERPL-MCNC: 7.7 G/DL (ref 6.8–8.8)
SODIUM SERPL-SCNC: 142 MMOL/L (ref 133–144)

## 2022-01-07 PROCEDURE — 80053 COMPREHEN METABOLIC PANEL: CPT | Performed by: STUDENT IN AN ORGANIZED HEALTH CARE EDUCATION/TRAINING PROGRAM

## 2022-01-07 PROCEDURE — 87517 HEPATITIS B DNA QUANT: CPT | Performed by: STUDENT IN AN ORGANIZED HEALTH CARE EDUCATION/TRAINING PROGRAM

## 2022-01-07 PROCEDURE — 82105 ALPHA-FETOPROTEIN SERUM: CPT | Performed by: STUDENT IN AN ORGANIZED HEALTH CARE EDUCATION/TRAINING PROGRAM

## 2022-01-07 PROCEDURE — 36415 COLL VENOUS BLD VENIPUNCTURE: CPT | Performed by: STUDENT IN AN ORGANIZED HEALTH CARE EDUCATION/TRAINING PROGRAM

## 2022-01-07 PROCEDURE — 99214 OFFICE O/P EST MOD 30 MIN: CPT | Mod: 25 | Performed by: STUDENT IN AN ORGANIZED HEALTH CARE EDUCATION/TRAINING PROGRAM

## 2022-01-07 PROCEDURE — 99396 PREV VISIT EST AGE 40-64: CPT | Mod: GC | Performed by: STUDENT IN AN ORGANIZED HEALTH CARE EDUCATION/TRAINING PROGRAM

## 2022-01-07 RX ORDER — AMLODIPINE BESYLATE 5 MG/1
5 TABLET ORAL DAILY
Qty: 30 TABLET | Refills: 3 | Status: SHIPPED | OUTPATIENT
Start: 2022-01-07 | End: 2023-12-21

## 2022-01-07 ASSESSMENT — MIFFLIN-ST. JEOR: SCORE: 1887.13

## 2022-01-07 ASSESSMENT — SOCIAL DETERMINANTS OF HEALTH (SDOH)
WITHIN THE LAST YEAR, HAVE YOU BEEN HUMILIATED OR EMOTIONALLY ABUSED IN OTHER WAYS BY YOUR PARTNER OR EX-PARTNER?: YES
WITHIN THE LAST YEAR, HAVE YOU BEEN AFRAID OF YOUR PARTNER OR EX-PARTNER?: NO
WITHIN THE LAST YEAR, HAVE TO BEEN RAPED OR FORCED TO HAVE ANY KIND OF SEXUAL ACTIVITY BY YOUR PARTNER OR EX-PARTNER?: NO
WITHIN THE LAST YEAR, HAVE YOU BEEN KICKED, HIT, SLAPPED, OR OTHERWISE PHYSICALLY HURT BY YOUR PARTNER OR EX-PARTNER?: YES

## 2022-01-07 ASSESSMENT — ACTIVITIES OF DAILY LIVING (ADL): DEPENDENT_IADLS:: INDEPENDENT

## 2022-01-07 ASSESSMENT — PATIENT HEALTH QUESTIONNAIRE - PHQ9: SUM OF ALL RESPONSES TO PHQ QUESTIONS 1-9: 6

## 2022-01-07 NOTE — PATIENT INSTRUCTIONS
1. Start amlodipine for your blood pressure.   2. Try to  a blood pressure cuff and check your blood pressure 2-3 times per week.   3. Follow up in 2 weeks.   4. Referral for therapy placed. Expect a call in the next few days  5. Please follow up with the mammogram.   6. Referral for the stomach surgery. Expect a call in the next few days.   7. Lab work today we will call with the results.     Preventive Health Recommendations  Female Ages 40 to 49    Yearly exam:     See your health care provider every year in order to  1. Review health changes.   2. Discuss preventive care.    3. Review your medicines if your doctor prescribed any.      Get a Pap test every three years (unless you have an abnormal result and your provider advises testing more often).      If you get Pap tests with HPV test, you only need to test every 5 years, unless you have an abnormal result. You do not need a Pap test if your uterus was removed (hysterectomy) and you have not had cancer.      You should be tested each year for STDs (sexually transmitted diseases), if you're at risk.     Ask your doctor if you should have a mammogram.      Have a colonoscopy (test for colon cancer) if someone in your family has had colon cancer or polyps before age 50.       Have a cholesterol test every 5 years.       Have a diabetes test (fasting glucose) after age 45. If you are at risk for diabetes, you should have this test every 3 years.    Shots: Get a flu shot each year. Get a tetanus shot every 10 years.     Nutrition:     Eat at least 5 servings of fruits and vegetables each day.    Eat whole-grain bread, whole-wheat pasta and brown rice instead of white grains and rice.    Get adequate Calcium and Vitamin D.      Lifestyle    Exercise at least 150 minutes a week (an average of 30 minutes a day, 5 days a week). This will help you control your weight and prevent disease.    Limit alcohol to one drink per day.    No smoking.     Wear sunscreen to  prevent skin cancer.    See your dentist every six months for an exam and cleaning.
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
## 2022-01-07 NOTE — PROGRESS NOTES
Preceptor Attestation:   Patient seen, evaluated and discussed with the resident Dr. Russell. I have verified the content of the note, which accurately reflects my assessment of the patient and the plan of care.  Supervising Physician:Benjamin Rosenstein, MD, MA  [facility]
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
## 2022-01-11 LAB
HBV DNA SERPL NAA+PROBE-ACNC: 176 IU/ML
HBV DNA SERPL NAA+PROBE-LOG IU: 2.2 {LOG_IU}/ML

## 2022-01-12 NOTE — RESULT ENCOUNTER NOTE
Please call patient with the following result:    Bang Lam,   Your lab work has returned. Your metabolic panel (electrolytes, kidney function and liver function) was normal. Your hepatitis B levels were low and not at a level where we need to treat. We will follow these levels up in 1 year. The liver cancer marker was also negative. Let me know if there are any questions.     Thanks,Bolivar Russell MD

## 2022-01-17 ENCOUNTER — PATIENT OUTREACH (OUTPATIENT)
Dept: FAMILY MEDICINE | Facility: CLINIC | Age: 42
End: 2022-01-17
Payer: COMMERCIAL

## 2022-01-17 NOTE — PROGRESS NOTES
SW called this date regarding therapy referral. Patient reported not wanting referral for therapy at this point stating mental health is better at this time. Patient agreed to notify PCP if therapy referral is wanted in the future. Patient reported continued difficulty with weight loss and wanting to speak to doctor about it.    BELKYS CANALES, KAMILAHSW, LADC

## 2022-01-19 NOTE — TELEPHONE ENCOUNTER
Called patient to discuss weight management referral that was placed at prior visit. No response and unable to leave voicemail. Will discuss with patient at follow up appointment.     Bolivar Russell MD

## 2022-01-21 ENCOUNTER — OFFICE VISIT (OUTPATIENT)
Dept: FAMILY MEDICINE | Facility: CLINIC | Age: 42
End: 2022-01-21
Payer: COMMERCIAL

## 2022-01-21 VITALS
SYSTOLIC BLOOD PRESSURE: 137 MMHG | HEART RATE: 85 BPM | BODY MASS INDEX: 42.06 KG/M2 | DIASTOLIC BLOOD PRESSURE: 78 MMHG | HEIGHT: 67 IN | WEIGHT: 268 LBS | TEMPERATURE: 97.8 F | RESPIRATION RATE: 16 BRPM | OXYGEN SATURATION: 96 %

## 2022-01-21 DIAGNOSIS — I10 PRIMARY HYPERTENSION: Primary | ICD-10-CM

## 2022-01-21 PROCEDURE — 99213 OFFICE O/P EST LOW 20 MIN: CPT | Mod: GC | Performed by: STUDENT IN AN ORGANIZED HEALTH CARE EDUCATION/TRAINING PROGRAM

## 2022-01-21 ASSESSMENT — MIFFLIN-ST. JEOR: SCORE: 1905.88

## 2022-01-21 NOTE — PROGRESS NOTES
ASSESSMENT/PLAN:     Primary hypertension  BP at goal today at 137/78 without medication. Patient not interested in starting amlodipine today. States that BP was elevated at last visit due to being anxious so possibly white coat hypertension. Discussed dietary changes today to help with BP including the DASH diet. Patient also working to get bariatric surgery referral scheduled. Will monitor BP at home and call if consistently above goal.       Patient Instructions     1. Start checking your blood pressure 2-3 times per week. Call us if your blood pressure is greater than 140/90 consistently for a follow up visit.   2.  Start following the DASH diet below to help with blood pressure.   3. Let us know if you need any help with the bariatric referral.     Schedule follow-up appointment in 3 months      There are no discontinued medications.    Bolivar Russell MD  [facility] Medicine Resident  Pager# [phone]    Precepted with: Dr. Molina    Options for treatment and follow-up care were reviewed with the patient and/or guardian. Genaro Hamilton and/or guardian engaged in the decision making process and verbalized understanding of the options discussed and agreed with the final plan    CHIEF COMPLAINT                                                      Chief Complaint   Patient presents with     Clinic Care Coordination - Follow-up     fu blood pressure     SUBJECTIVE:                                                    Genaro Hamilton is a 41 year old year old female who presents to clinic today for the following health issues:    HTN:  -Hasn't been checking blood pressure at home  -did not  the amlodipine. Not interested in start this at this time  -Hasn't changed diet, wondering about dietary changes to help with BP and weight loss  -Running into insurance issues with the bariatric referral    Patient is an established patient of this  clinic.    ----------------------------------------------------------------------------------------------------------------------  Patient Active Problem List   Diagnosis     Chronic hepatitis B virus infection (H)     Pruritic disorder     Papanicolaou smear of cervix with low grade squamous intraepithelial lesion (LGSIL)     Papanicolaou smear of cervix with atypical squamous cells of undetermined significance (ASC-US)     Encounter for supervision of multigravida of advanced maternal age in second trimester     Morbid obesity due to excess calories (H)     Current moderate episode of major depressive disorder without prior episode (H)     Primary hypertension     Past Surgical History:   Procedure Laterality Date     LEEP TX, CERVICAL         Social History     Tobacco Use     Smoking status: Former Smoker     Years: 5.00     Smokeless tobacco: Never Used   Substance Use Topics     Alcohol use: Not on file     Comment: Occasional     Family History   Problem Relation Age of Onset     Other - See Comments Mother         chest pain     Diabetes No family hx of      Coronary Artery Disease No family hx of      Hypertension No family hx of      Breast Cancer No family hx of      Colon Cancer No family hx of      Prostate Cancer No family hx of      Other Cancer No family hx of      Cerebrovascular Disease No family hx of      Asthma No family hx of          Problem list and past medical, surgical, social, and family histories reviewed & adjusted, as indicated.    Current Outpatient Medications   Medication Sig Dispense Refill     amLODIPine (NORVASC) 5 MG tablet Take 1 tablet (5 mg) by mouth daily (Patient not taking: Reported on 1/21/2022) 30 tablet 3     sertraline (ZOLOFT) 50 MG tablet Take 1 tablet (50 mg) by mouth daily (Patient not taking: Reported on 1/21/2022) 90 tablet 1     Medication list reviewed and updated as indicated.    Allergies   Allergen Reactions     Nka [No Known Allergies]      Allergies  "reviewed and updated as indicated.  ----------------------------------------------------------------------------------------------------------------------  ROS:     ROS: 10 point ROS neg other than the symptoms noted above in the HPI.    OBJECTIVE:     /78   Pulse 85   Temp 97.8  F (36.6  C)   Resp 16   Ht 1.69 m (5' 6.54\")   Wt 121.6 kg (268 lb)   SpO2 96%   BMI 42.56 kg/m    Body mass index is 42.56 kg/m .  GENERAL: Appears well and in no acute distress.  CARDIOVASCULAR: Regular rate and rhythm, normal S1 and S2 without murmur. No extra heartsounds or friction rub.   RESPIRATORY: Lungs clear to auscultation bilaterally. No wheezing or crackles. cNo prolonged expiration. Symmetrical chest rise.  MSK: No gross extremity deformities. No peripheral edema. Normal muscle bulk.    Diagnostic Test Results:  none           "

## 2022-01-21 NOTE — PATIENT INSTRUCTIONS
1. Start checking your blood pressure 2-3 times per week. Call us if your blood pressure is greater than 140/90 consistently for a follow up visit.   2.  Start following the DASH diet below to help with blood pressure.   3. Let us know if you need any help with the bariatric referral.     Patient Education     Eating Heart-Healthy Food: Using the DASH Plan    Eating for your heart doesn t have to be hard or boring. You just need to know how to make healthier choices. The DASH eating plan has been developed to help you do just that. DASH stands for Dietary Approaches to Stop Hypertension. It is a plan that has been proven to be healthier for your heart and to lower your risk for high blood pressure. It can also help lower your risk for cancer, heart disease, osteoporosis, and diabetes.  Choosing from each food group  Choose foods from each of the food groups below each day. Try to get the recommended number of servings for each food group. The serving numbers are based on a diet of 2,000 calories a day. Talk with your healthcare provider if you re not sure about your calorie needs. Along with getting the correct servings, the DASH plan also advises less than 2,300 mg of salt (sodium) per day. Lowering sodium intake to 1,500 mg per day lowers blood pressure even more. (There's about 2,300 mg of sodium in 1 teaspoon of salt.)      Grains  Servings: 6 to 8 a day  A serving is:    1 slice bread    1 ounce dry cereal    Half a cup cooked rice, pasta or cereal  Best choices: Whole grains and any grains high in fiber. Vegetables  Servings: 4 to 5 a day  A serving is:    1 cup raw leafy vegetable    Half a cup cut-up raw or cooked vegetable    Half a cup vegetable juice  Best choices: Fresh or frozen vegetables prepared without added salt or fat.   Fruits  Servings: 4 to 5 a day  A serving is:    1 medium fruit    One-quarter cup dried fruit    Half a cup fresh, frozen, or canned fruit    Half a cup of 100% fruit  juices  Best choices: A variety of fresh fruits of different colors. Whole fruits are a better choice than fruit juices. Low-fat or fat-free dairy  Servings: 2 to 3 a day  A serving is:    1 cup milk    1 cup yogurt    One and a half ounces cheese  Best choices: Skim or 1% milk, low-fat or fat-free yogurt or buttermilk, and low-fat cheeses.         Lean meats, poultry, fish  Servings: 6 or fewer a day  A serving is:    1 ounce cooked meats, poultry, or fish    1 egg  Best choices: Lean poultry and fish. Trim away visible fat. Broil, grill, roast, or boil instead of frying. Remove skin from poultry before eating. Limit how much red meat you eat.  Nuts, seeds, beans  Servings: 4 to 5 a week  A serving is:    One-third cup nuts (one and a half ounces)    2 tablespoons nut butter or seeds    Half a cup cooked dry beans or legumes  Best choices: Dry roasted nuts with no salt added, lentils, kidney beans, garbanzo beans, and whole leahy beans.   Fats and oils  Servings: 2 to 3 a day  A serving is:    1 teaspoon vegetable oil    1 teaspoon soft margarine    1 tablespoon mayonnaise    2 tablespoons salad dressing  Best choices: Nut and vegetable oils (nontropical vegetable oils), such as olive and canola oil. Sweets  Servings: 5 a week or fewer  A serving is:    1 tablespoon sugar, maple syrup, or honey    1 tablespoon jam or jelly    1 half-ounce jelly beans (about 15)    1 cup lemonade  Best choices: Dried fruit can be a satisfying sweet. Choose low-fat sweets. And watch your serving sizes!      For more on the DASH eating plan, visit:  www.nhlbi.nih.gov/health/health-topics/topics/dash   Yancy last reviewed this educational content on 7/1/2019 2000-2021 [employer]. All rights reserved. This information is not intended as a substitute for professional medical care. Always follow your healthcare professional's instructions.

## 2022-01-24 PROBLEM — I10 PRIMARY HYPERTENSION: Status: ACTIVE | Noted: 2022-01-24

## 2022-01-28 NOTE — PROGRESS NOTES
Faculty Supervision of Residents   I have examined this patient and the medical care has been evaluated and discussed with the resident. See resident note outlining our discussion.      Kristi Molina MD

## 2022-08-23 ENCOUNTER — PATIENT OUTREACH (OUTPATIENT)
Dept: CARE COORDINATION | Facility: CLINIC | Age: 42
End: 2022-08-23

## 2022-08-23 NOTE — PROGRESS NOTES
Clinic Care Coordination - Chart Review Only    Situation/Background: Patient chart reviewed by care coordinator related to [facility] conversion.    Assessment: Patient no linger followed by Clinic Care Coordination.    Plan: Patient's chart updated to align with [facility]

## 2022-11-23 ENCOUNTER — OFFICE VISIT (OUTPATIENT)
Dept: FAMILY MEDICINE | Facility: CLINIC | Age: 42
End: 2022-11-23
Payer: COMMERCIAL

## 2022-11-23 VITALS
SYSTOLIC BLOOD PRESSURE: 137 MMHG | OXYGEN SATURATION: 99 % | HEART RATE: 79 BPM | TEMPERATURE: 98.1 F | HEIGHT: 64 IN | BODY MASS INDEX: 46.61 KG/M2 | WEIGHT: 273 LBS | DIASTOLIC BLOOD PRESSURE: 98 MMHG

## 2022-11-23 DIAGNOSIS — Z23 NEED FOR PROPHYLACTIC VACCINATION AND INOCULATION AGAINST INFLUENZA: Primary | ICD-10-CM

## 2022-11-23 DIAGNOSIS — Z11.1 SCREENING EXAMINATION FOR PULMONARY TUBERCULOSIS: ICD-10-CM

## 2022-11-23 PROCEDURE — 90471 IMMUNIZATION ADMIN: CPT | Performed by: STUDENT IN AN ORGANIZED HEALTH CARE EDUCATION/TRAINING PROGRAM

## 2022-11-23 PROCEDURE — 86481 TB AG RESPONSE T-CELL SUSP: CPT | Performed by: STUDENT IN AN ORGANIZED HEALTH CARE EDUCATION/TRAINING PROGRAM

## 2022-11-23 PROCEDURE — 36415 COLL VENOUS BLD VENIPUNCTURE: CPT | Performed by: STUDENT IN AN ORGANIZED HEALTH CARE EDUCATION/TRAINING PROGRAM

## 2022-11-23 PROCEDURE — 99212 OFFICE O/P EST SF 10 MIN: CPT | Mod: 25 | Performed by: STUDENT IN AN ORGANIZED HEALTH CARE EDUCATION/TRAINING PROGRAM

## 2022-11-23 PROCEDURE — 90686 IIV4 VACC NO PRSV 0.5 ML IM: CPT | Performed by: STUDENT IN AN ORGANIZED HEALTH CARE EDUCATION/TRAINING PROGRAM

## 2022-11-23 PROCEDURE — 90716 VAR VACCINE LIVE SUBQ: CPT | Performed by: STUDENT IN AN ORGANIZED HEALTH CARE EDUCATION/TRAINING PROGRAM

## 2022-11-23 PROCEDURE — 90707 MMR VACCINE SC: CPT | Performed by: STUDENT IN AN ORGANIZED HEALTH CARE EDUCATION/TRAINING PROGRAM

## 2022-11-23 PROCEDURE — 90746 HEPB VACCINE 3 DOSE ADULT IM: CPT | Performed by: STUDENT IN AN ORGANIZED HEALTH CARE EDUCATION/TRAINING PROGRAM

## 2022-11-23 PROCEDURE — 90472 IMMUNIZATION ADMIN EACH ADD: CPT | Performed by: STUDENT IN AN ORGANIZED HEALTH CARE EDUCATION/TRAINING PROGRAM

## 2022-11-23 NOTE — PROGRESS NOTES
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 No history of TB and no known contacts.   Plan: Quantiferon-TB Gold Plus        Options for treatment and follow-up care were reviewed with the patient and/or guardian. Genaro Hamilton and/or guardian engaged in the decision making process and verbalized understanding of the options discussed and agreed with the final plan.    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   - Due for physical but will come back to complete this           PMHX:     Patient Active Problem List   Diagnosis     Chronic hepatitis B virus infection (H)     Pruritic disorder     Papanicolaou smear of cervix with low grade squamous intraepithelial lesion (LGSIL)     Papanicolaou smear of cervix with atypical squamous cells of undetermined significance (ASC-US)     Encounter for supervision of multigravida of advanced maternal age in second trimester     Morbid obesity due to excess calories (H)     Current moderate episode of major depressive disorder without prior episode (H)     Primary hypertension       Current Outpatient Medications   Medication Sig Dispense Refill     amLODIPine (NORVASC) 5 MG tablet Take 1 tablet (5 mg) by mouth daily (Patient not  "taking: Reported on 1/21/2022) 30 tablet 3     sertraline (ZOLOFT) 50 MG tablet Take 1 tablet (50 mg) by mouth daily (Patient not taking: Reported on 1/21/2022) 90 tablet 1       Social History     Tobacco Use     Smoking status: Former     Years: 5.00     Types: Cigarettes     Smokeless tobacco: Never   Vaping Use     Vaping Use: Every day   Substance Use Topics     Drug use: No          Allergies   Allergen Reactions     Nka [No Known Allergies]        No results found for this or any previous visit (from the past 24 hour(s)).         Review of Systems:     10 point ROS negative except for what is noted in HPI          Physical Exam:     Vitals:    11/23/22 1013 11/23/22 1020   BP: (!) 134/103 (!) 137/98   Pulse: 79    Temp: 98.1  F (36.7  C)    TempSrc: Tympanic    SpO2: 99%    Weight: 123.8 kg (273 lb)    Height: 1.625 m (5' 3.98\")      Body mass index is 46.9 kg/m .      GENERAL APPEARANCE: healthy, alert and no distress,  EYES: Eyes grossly normal to inspection  MS: extremities normal- no gross deformities noted  SKIN: no suspicious lesions or rashes  NEURO: Normal strength and tone, sensory exam grossly normal, mentation appears intact and speech normal  PSYCH: mood and affect normal/bright    "

## 2022-11-25 LAB
GAMMA INTERFERON BACKGROUND BLD IA-ACNC: 0.32 IU/ML
M TB IFN-G BLD-IMP: NEGATIVE
M TB IFN-G CD4+ BCKGRND COR BLD-ACNC: 9.68 IU/ML
MITOGEN IGNF BCKGRD COR BLD-ACNC: -0.11 IU/ML
MITOGEN IGNF BCKGRD COR BLD-ACNC: 0.04 IU/ML
QUANTIFERON MITOGEN: 10 IU/ML
QUANTIFERON NIL TUBE: 0.32 IU/ML
QUANTIFERON TB1 TUBE: 0.21 IU/ML
QUANTIFERON TB2 TUBE: 0.36

## 2022-11-28 NOTE — PROGRESS NOTES
Preceptor Attestation:  Patient's case reviewed and discussed with Sue Thomas MD resident and I evaluated the patient. I agree with written assessment and plan of care.  Supervising Physician:  Julito Ly MD, MD [facility]

## 2023-12-01 ENCOUNTER — OFFICE VISIT (OUTPATIENT)
Dept: FAMILY MEDICINE | Facility: CLINIC | Age: 43
End: 2023-12-01
Payer: COMMERCIAL

## 2023-12-01 ENCOUNTER — HOSPITAL ENCOUNTER (OUTPATIENT)
Dept: GENERAL RADIOLOGY | Facility: HOSPITAL | Age: 43
Discharge: HOME | End: 2023-12-01
Attending: PHYSICIAN ASSISTANT | Admitting: PHYSICIAN ASSISTANT
Payer: COMMERCIAL

## 2023-12-01 VITALS
OXYGEN SATURATION: 98 % | TEMPERATURE: 97.9 F | SYSTOLIC BLOOD PRESSURE: 171 MMHG | HEART RATE: 77 BPM | DIASTOLIC BLOOD PRESSURE: 94 MMHG | RESPIRATION RATE: 18 BRPM

## 2023-12-01 DIAGNOSIS — G89.29 CHRONIC PAIN OF LEFT KNEE: ICD-10-CM

## 2023-12-01 DIAGNOSIS — M25.562 CHRONIC PAIN OF LEFT KNEE: ICD-10-CM

## 2023-12-01 DIAGNOSIS — I10 HYPERTENSION, UNSPECIFIED TYPE: Primary | ICD-10-CM

## 2023-12-01 PROCEDURE — 99214 OFFICE O/P EST MOD 30 MIN: CPT | Performed by: PHYSICIAN ASSISTANT

## 2023-12-01 PROCEDURE — 73565 X-RAY EXAM OF KNEES: CPT

## 2023-12-01 RX ORDER — AMLODIPINE BESYLATE 5 MG/1
5 TABLET ORAL DAILY
Qty: 30 TABLET | Refills: 0 | Status: SHIPPED | OUTPATIENT
Start: 2023-12-01 | End: 2023-12-29
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
## 2023-12-01 NOTE — LETTER
December 1, 2023      Genaro Hamilton  [address]   [place] [address]        To Whom It May Concern:    Genaro Hamilton  was seen on 12-1-23 due to injury.  She may return to work with the following restrictions:   May need to use gait aid such as cane for ambulation and recommend no activities that require ambulation or standing.  May perform unlimited duties sitting.  This will remain in place for up to 14 days awaiting orthopedic evaluation and further recommendations for treatment.        Sincerely,        Agnieszka Ramhan PA-C

## 2023-12-04 ENCOUNTER — ALLIED HEALTH/NURSE VISIT (OUTPATIENT)
Dept: FAMILY MEDICINE | Facility: CLINIC | Age: 43
End: 2023-12-04
Payer: COMMERCIAL

## 2023-12-04 ENCOUNTER — OFFICE VISIT (OUTPATIENT)
Dept: ORTHOPEDICS | Facility: CLINIC | Age: 43
End: 2023-12-04
Attending: PHYSICIAN ASSISTANT
Payer: COMMERCIAL

## 2023-12-04 DIAGNOSIS — G89.29 CHRONIC PAIN OF LEFT KNEE: ICD-10-CM

## 2023-12-04 DIAGNOSIS — Z23 NEED FOR PROPHYLACTIC VACCINATION AND INOCULATION AGAINST INFLUENZA: Primary | ICD-10-CM

## 2023-12-04 DIAGNOSIS — M17.0 BILATERAL PRIMARY OSTEOARTHRITIS OF KNEE: Primary | ICD-10-CM

## 2023-12-04 DIAGNOSIS — M25.562 CHRONIC PAIN OF LEFT KNEE: ICD-10-CM

## 2023-12-04 DIAGNOSIS — E66.01 MORBID OBESITY (H): ICD-10-CM

## 2023-12-04 PROCEDURE — 99207 PR NO CHARGE NURSE ONLY: CPT

## 2023-12-04 PROCEDURE — 90686 IIV4 VACC NO PRSV 0.5 ML IM: CPT

## 2023-12-04 PROCEDURE — 99204 OFFICE O/P NEW MOD 45 MIN: CPT | Performed by: STUDENT IN AN ORGANIZED HEALTH CARE EDUCATION/TRAINING PROGRAM

## 2023-12-04 PROCEDURE — 90471 IMMUNIZATION ADMIN: CPT
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
## 2023-12-04 NOTE — PATIENT INSTRUCTIONS
"Thank you for choosing [facility] Sports Medicine!    DR. LOVE'S CLINIC LOCATIONS:     [place]  TRIAGE LINE: [phone] [address] APPOINTMENTS: [phone]   [place] [address] RADIOLOGY: [phone]   (Mondays & Tuesdays) HAND THERAPY: [phone]    PHYSICAL THERAPY: [phone]   [place] BILLING QUESTIONS: [phone] [address] FAX: [phone]   [place] [address]    (Thursdays & Fridays)     Non-Operative treatment of Knee Osteoarthritis    To Decrease Stress  Stay fit and get regular exercise    Muscle Strengthening: Consider physical therapy  Activity Modification: Avoid high-impact activities  Weight Control  Consider using walking poles/cane or a knee brace to offload knee    To Decrease Pain  Tylenol (Acetaminophen) as needed 2 tablets (1,000 mg) three times per day, not to exceed 3,000 mg per day   Trial topical Voltaren (Diclofenac) gel up to 4x daily to area of pain  Glucosamine chondroiten (try taking for 3 months and if helpful, continue)  Steroid injections (no sooner than every 3 months)  Viscosupplementation/\"gel\" injections (Synvisc, Euflexxa, etc. are brand names)  Platelet Rich Plasma (Not covered by insurance)     -Try a knee compression sleeve  "
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
## 2023-12-09 ENCOUNTER — HEALTH MAINTENANCE LETTER (OUTPATIENT)
Age: 43
End: 2023-12-09

## 2023-12-13 ENCOUNTER — THERAPY VISIT (OUTPATIENT)
Dept: PHYSICAL THERAPY | Facility: REHABILITATION | Age: 43
End: 2023-12-13
Attending: STUDENT IN AN ORGANIZED HEALTH CARE EDUCATION/TRAINING PROGRAM
Payer: COMMERCIAL

## 2023-12-13 DIAGNOSIS — M17.0 BILATERAL PRIMARY OSTEOARTHRITIS OF KNEE: ICD-10-CM

## 2023-12-13 PROCEDURE — 97161 PT EVAL LOW COMPLEX 20 MIN: CPT | Mod: GP

## 2023-12-13 PROCEDURE — 97110 THERAPEUTIC EXERCISES: CPT | Mod: GP

## 2023-12-13 NOTE — PROGRESS NOTES
PHYSICAL THERAPY EVALUATION  Type of Visit: Evaluation    See electronic medical record for Abuse and Falls Screening details.    Subjective Pt reports bilateral knee pain. Pt reports the left knee is the most painful with pain all around the knee. Pt reports that her pain comes and goes with increase in pain during cold weather. Pt reports sometimes ambulates with a cane. Pt reports pain when walking and completing stairs. Pt reports pain has been going on for about 10 years. Pt reports was given cream to help with the pain. Pt reports also takes tylenol which helps with the pain. Pt reports enjoys doing dance therapy, but is limited due to pain. Pt reports pain worse in the morning. Pt reports no numbness or tingling, but has swelling around both knees. Pt reports increased swelling around her left knee, with some swelling around her right knee. Pt reports when moving her L knee, she has pain going down the front of her left leg/shin.      Presenting condition or subjective complaint: my left leg pain  Date of onset: 12/04/23    Relevant medical history: Concussions; Dizziness; High blood pressure; Overweight   Dates & types of surgery:      Prior diagnostic imaging/testing results: X-ray     Prior therapy history for the same diagnosis, illness or injury: No      Prior Level of Function  Transfers: Independent  Ambulation: Independent    Living Environment  Social support: With family members   Type of home: Basement   Stairs to enter the home: Yes       Ramp:     Stairs inside the home:         Help at home:    Equipment owned:       Employment: Yes    Hobbies/Interests: dance    Patient goals for therapy:      Pain assessment: Pain present     Objective   KNEE EVALUATION  PAIN: Pain Level at Rest: 5/10  Pain Level with Use: 8/10  Pain Location: knee and both knees  Pain Quality: Aching and painful  Pain Frequency: intermittent or daily  Pain is Worst: mornings  Pain is Exacerbated By: walking, stairs  Pain is  Relieved By: otc medications and cream and tylenol  Pain Progression: Worsened  GAIT:  Weightbearing Status: WBAT  Assistive Device(s): None  Gait Deviations: Antalgic  ROM: AROM WFL  PROM WFL  STRENGTH:   Pain: - none + mild ++ moderate +++ severe  Strength Scale: 0-5/5 Left Right   Knee Flexion 4-, + (mild) 4   Knee Extension 3+, + (mild) 4-, + (mild)   Quad Set 3+ 3+     FLEXIBILITY:  Limited due to pain  SPECIAL TESTS:    Left Right   Apley's (Meniscus)     Magdaleno's (Meniscus)     Reanna's (ITB/TFL)     Patellar Apprehension Test     Patella Tracking     Ligamentous Stability     Anterior Drawer (ACL) Negative Negative   Posterior Drawer (PCL) Negative Negative   Prone Dial Test at 30 Deg and 90 Deg (PCL/PLC)     Valgus Stress Testing at 0 Deg and 30 Deg Negative Negative   Varus Stress Testing at 0 Deg and 30 Deg  Negative Negative     PALPATION:  Tenderness upon palpation of medial portion of left knee , tenderness upon palpation of lateral and medial portion of right knee  JOINT MOBILITY:     Assessment & Plan   CLINICAL IMPRESSIONS  Medical Diagnosis: Bilateral primary osteoarthritis of knee    Treatment Diagnosis: Bilateral knee pain   Impression/Assessment: Patient is a 43 year old female with bilateral knee pain complaints.  The following significant findings have been identified: Pain, Decreased ROM/flexibility, Decreased strength, Impaired gait, and Decreased activity tolerance. These impairments interfere with their ability to perform self care tasks, work tasks, recreational activities, household chores, household mobility, and community mobility as compared to previous level of function.     Clinical Decision Making (Complexity):  Clinical Presentation: Stable/Uncomplicated  Clinical Presentation Rationale: based on medical and personal factors listed in PT evaluation  Clinical Decision Making (Complexity): Low complexity    PLAN OF CARE  Treatment Interventions:  Modalities: Cryotherapy, E-stim, Hot  Pack  Interventions: Gait Training, Manual Therapy, Neuromuscular Re-education, Therapeutic Activity, Therapeutic Exercise, Self-Care/Home Management    Long Term Goals     PT Goal 1  Goal Identifier: HEP  Goal Description: Pt will be independent with HEP to improve mobility and decrease pain.  Target Date: 02/20/24  PT Goal 2  Goal Identifier: Walking  Goal Description: Pt will report ability to walk greater than 2 blocks with less than 2/10 pain to improve functional mobility and increase tolerance completing ADL's, recreational activities and work related tasks.  Target Date: 02/20/24  PT Goal 3  Goal Identifier: Stairs  Goal Description: Pt will report ability to complete one flight of stairs with less than 2/10 pain to improve functional mobility and increase tolerance completing ADL's, recreational activities and work related tasks.  Target Date: 02/20/24      Frequency of Treatment: 1 time a week  Duration of Treatment: 1 time a week or every other week for a minimum of 10 weeks, minimum of 5 sessions    Recommended Referrals to Other Professionals:  none  Education Assessment:   Learner/Method: Patient;Demonstration;Pictures/Video    Risks and benefits of evaluation/treatment have been explained.   Patient/Family/caregiver agrees with Plan of Care.     Evaluation Time:     PT Eval, Low Complexity Minutes (66716): 20   Present: Not applicable     Signing Clinician: Lianna Chang, PT      [facility]                                                                                   OUTPATIENT PHYSICAL THERAPY      PLAN OF TREATMENT FOR OUTPATIENT REHABILITATION   Patient's Last Name, First Name, Genaro Serrano YOB: 1980   Provider's Name   [facility]   Medical Record No.  [id number]     Onset Date: 12/04/23  Start of Care Date: 12/13/23     Medical Diagnosis:  Bilateral primary osteoarthritis of knee      PT  Treatment Diagnosis:  Bilateral knee pain Plan of Treatment  Frequency/Duration: 1 time a week/ 1 time a week or every other week for a minimum of 10 weeks, minimum of 5 sessions    Certification date from 12/13/23 to 02/20/24         See note for plan of treatment details and functional goals     Lianna Chang, PT                         I CERTIFY THE NEED FOR THESE SERVICES FURNISHED UNDER        THIS PLAN OF TREATMENT AND WHILE UNDER MY CARE     (Physician attestation of this document indicates review and certification of the therapy plan).              Referring Provider:  Charito Recio    Initial Assessment  See Epic Evaluation- Start of Care Date: 12/13/23

## 2023-12-15 ENCOUNTER — OFFICE VISIT (OUTPATIENT)
Dept: FAMILY MEDICINE | Facility: CLINIC | Age: 43
End: 2023-12-15
Payer: COMMERCIAL

## 2023-12-15 VITALS
WEIGHT: 278 LBS | OXYGEN SATURATION: 97 % | HEART RATE: 97 BPM | DIASTOLIC BLOOD PRESSURE: 64 MMHG | RESPIRATION RATE: 20 BRPM | TEMPERATURE: 98.3 F | HEIGHT: 66 IN | BODY MASS INDEX: 44.68 KG/M2 | SYSTOLIC BLOOD PRESSURE: 111 MMHG

## 2023-12-15 DIAGNOSIS — E66.813 CLASS 3 SEVERE OBESITY DUE TO EXCESS CALORIES WITHOUT SERIOUS COMORBIDITY WITH BODY MASS INDEX (BMI) OF 40.0 TO 44.9 IN ADULT (H): ICD-10-CM

## 2023-12-15 DIAGNOSIS — Z12.4 CERVICAL CANCER SCREENING: ICD-10-CM

## 2023-12-15 DIAGNOSIS — Z00.00 ROUTINE GENERAL MEDICAL EXAMINATION AT A HEALTH CARE FACILITY: Primary | ICD-10-CM

## 2023-12-15 DIAGNOSIS — E66.01 CLASS 3 SEVERE OBESITY DUE TO EXCESS CALORIES WITHOUT SERIOUS COMORBIDITY WITH BODY MASS INDEX (BMI) OF 40.0 TO 44.9 IN ADULT (H): ICD-10-CM

## 2023-12-15 DIAGNOSIS — B18.1 CHRONIC HEPATITIS B VIRUS INFECTION (H): ICD-10-CM

## 2023-12-15 LAB — HBA1C MFR BLD: 5.4 % (ref 0–5.6)

## 2023-12-15 PROCEDURE — 82105 ALPHA-FETOPROTEIN SERUM: CPT

## 2023-12-15 PROCEDURE — 90632 HEPA VACCINE ADULT IM: CPT

## 2023-12-15 PROCEDURE — G0145 SCR C/V CYTO,THINLAYER,RESCR: HCPCS

## 2023-12-15 PROCEDURE — 83036 HEMOGLOBIN GLYCOSYLATED A1C: CPT

## 2023-12-15 PROCEDURE — 90480 ADMN SARSCOV2 VAC 1/ONLY CMP: CPT

## 2023-12-15 PROCEDURE — 80076 HEPATIC FUNCTION PANEL: CPT

## 2023-12-15 PROCEDURE — 99396 PREV VISIT EST AGE 40-64: CPT | Mod: 25

## 2023-12-15 PROCEDURE — 36415 COLL VENOUS BLD VENIPUNCTURE: CPT

## 2023-12-15 PROCEDURE — 90471 IMMUNIZATION ADMIN: CPT

## 2023-12-15 PROCEDURE — 87624 HPV HI-RISK TYP POOLED RSLT: CPT

## 2023-12-15 PROCEDURE — 80061 LIPID PANEL: CPT

## 2023-12-15 PROCEDURE — 99214 OFFICE O/P EST MOD 30 MIN: CPT | Mod: 25

## 2023-12-15 PROCEDURE — 91320 SARSCV2 VAC 30MCG TRS-SUC IM: CPT

## 2023-12-15 ASSESSMENT — PATIENT HEALTH QUESTIONNAIRE - PHQ9
SUM OF ALL RESPONSES TO PHQ QUESTIONS 1-9: 12
5. POOR APPETITE OR OVEREATING: NOT AT ALL

## 2023-12-15 ASSESSMENT — ANXIETY QUESTIONNAIRES
1. FEELING NERVOUS, ANXIOUS, OR ON EDGE: NOT AT ALL
GAD7 TOTAL SCORE: 0
6. BECOMING EASILY ANNOYED OR IRRITABLE: NOT AT ALL
5. BEING SO RESTLESS THAT IT IS HARD TO SIT STILL: NOT AT ALL
3. WORRYING TOO MUCH ABOUT DIFFERENT THINGS: NOT AT ALL
IF YOU CHECKED OFF ANY PROBLEMS ON THIS QUESTIONNAIRE, HOW DIFFICULT HAVE THESE PROBLEMS MADE IT FOR YOU TO DO YOUR WORK, TAKE CARE OF THINGS AT HOME, OR GET ALONG WITH OTHER PEOPLE: NOT DIFFICULT AT ALL
GAD7 TOTAL SCORE: 0
7. FEELING AFRAID AS IF SOMETHING AWFUL MIGHT HAPPEN: NOT AT ALL
2. NOT BEING ABLE TO STOP OR CONTROL WORRYING: NOT AT ALL

## 2023-12-15 NOTE — PROGRESS NOTES
"   SUBJECTIVE:   Genaro is a 43 year old, presenting for the following:  Physical    HPI  L knee pain: Started seeing physical therapy last week.   Hypertension: \"today it's ok, but always high.\" On amlodipine 5 mg. Otherwise, on no other medications.   PHQ 12. Denies SI. \"I'm not depressed anymore.\" Not on Zoloft anymore. Is working two jobs right now, causing her stress, not sleeping well.   FHx: Mom has \"chest pains.\" No other history reportd.  SHx: Not currently sexual active. Mentions distrust of , who has been unfaithful in past. No current sxs. Vaping off and on, not for 2 months now, \"my kids don't let me.\" Drinks wine or \"Icelandic cream\" occasionally.     Social History     Tobacco Use    Smoking status: Former     Years: 5     Types: Cigarettes, Vaping Device    Smokeless tobacco: Never   Substance Use Topics    Alcohol use: Yes     Comment: Occasional             12/21/2023     2:38 PM   Alcohol Use   Prescreen: >3 drinks/day or >7 drinks/week? No     Reviewed orders with patient.  Reviewed health maintenance and updated orders accordingly - Yes    Breast Cancer Screening:  Any new diagnosis of family breast, ovarian, or bowel cancer? No    FHS-7:        No data to display              History of abnormal Pap smear: YES - ART 2/3 on biopsy - PAP/HPV co-testing at 12, 24 months.  If two negative results repeat co-testing in 3 years, if negative then routine screening.      Latest Ref Rng & Units 12/15/2023     4:07 PM 11/8/2019    10:48 AM 10/4/2018    10:36 AM   PAP / HPV   PAP  Negative for Intraepithelial Lesion or Malignancy (NILM)      HPV 16 DNA Negative Negative  Negative  Negative    HPV 18 DNA Negative Negative  Negative  Negative    Other HR HPV Negative Negative  Negative  Negative      Reviewed and updated as needed this visit by clinical staff   Tobacco  Allergies  Meds  Problems  Med Hx  Surg Hx  Fam Hx  Soc   Hx        Reviewed and updated as needed this visit by Provider   " "Tobacco    Problems  Med Hx  Surg Hx  Fam Hx  Soc Hx       Review of Systems  CONSTITUTIONAL: NEGATIVE for fever, chills, change in weight  INTEGUMENTARU/SKIN: NEGATIVE for worrisome rashes, moles or lesions  EYES: NEGATIVE for vision changes or irritation  ENT: NEGATIVE for ear, mouth and throat problems  RESP: NEGATIVE for significant cough or SOB  BREAST: NEGATIVE for masses, tenderness or discharge  CV: NEGATIVE for chest pain, palpitations or peripheral edema  GI: NEGATIVE for nausea, abdominal pain, heartburn, or change in bowel habits  : NEGATIVE for unusual urinary or vaginal symptoms. Periods are regular.  MUSCULOSKELETAL: NEGATIVE for significant arthralgias or myalgia  NEURO: NEGATIVE for weakness, dizziness or paresthesias  PSYCHIATRIC: NEGATIVE for changes in mood or affect     OBJECTIVE:   /64   Pulse 97   Temp 98.3  F (36.8  C) (Oral)   Resp 20   Ht 1.676 m (5' 6\")   Wt 126.1 kg (278 lb)   LMP 11/26/2023   SpO2 97%   BMI 44.87 kg/m    Physical Exam  GENERAL: healthy, alert and no distress  EYES: Eyes grossly normal to inspection, PERRL and conjunctivae and sclerae normal  HENT: ear canals and TM's normal, nose and mouth without ulcers or lesions  NECK: no adenopathy, no asymmetry, masses, or scars and thyroid normal to palpation  RESP: lungs clear to auscultation - no rales, rhonchi or wheezes  CV: regular rate and rhythm, normal S1 S2, no S3 or S4, no murmur, click or rub, no peripheral edema and peripheral pulses strong  ABDOMEN: soft, nontender, no hepatosplenomegaly, no masses and bowel sounds normal  MS: no gross musculoskeletal defects noted, no edema  SKIN: no suspicious lesions or rashes  NEURO: Normal strength and tone, mentation intact and speech normal  PSYCH: mentation appears normal, affect normal/bright    Diagnostic Test Results:  Labs reviewed in Epic  Results for orders placed or performed in visit on 12/15/23   AFP tumor marker     Status: Normal   Result Value " Ref Range    AFP tumor marker <1.8 <=8.3 ng/mL    Narrative    This result is obtained using the Roche Elecsys AFP method on  the zee e801 immunoassay analyzer. Results obtained with different assay methods or kits cannot be used interchangeably.  Reference ranges apply to non-pregnant females only.   Lipid panel reflex to direct LDL Non-fasting     Status: Abnormal   Result Value Ref Range    Cholesterol 177 <200 mg/dL    Triglycerides 211 (H) <150 mg/dL    Direct Measure HDL 36 (L) >=50 mg/dL    LDL Cholesterol Calculated 99 <=100 mg/dL    Non HDL Cholesterol 141 (H) <130 mg/dL    Patient Fasting > 8hrs? Unknown     Narrative    Cholesterol  Desirable:  <200 mg/dL    Triglycerides  Normal:  Less than 150 mg/dL  Borderline High:  150-199 mg/dL  High:  200-499 mg/dL  Very High:  Greater than or equal to 500 mg/dL    Direct Measure HDL  Female:  Greater than or equal to 50 mg/dL   Male:  Greater than or equal to 40 mg/dL    LDL Cholesterol  Desirable:  <100mg/dL  Above Desirable:  100-129 mg/dL   Borderline High:  130-159 mg/dL   High:  160-189 mg/dL   Very High:  >= 190 mg/dL    Non HDL Cholesterol  Desirable:  130 mg/dL  Above Desirable:  130-159 mg/dL  Borderline High:  160-189 mg/dL  High:  190-219 mg/dL  Very High:  Greater than or equal to 220 mg/dL   Hemoglobin A1c     Status: Normal   Result Value Ref Range    Hemoglobin A1C 5.4 0.0 - 5.6 %   Hepatic panel     Status: Normal   Result Value Ref Range    Protein Total 7.5 6.4 - 8.3 g/dL    Albumin 4.1 3.5 - 5.2 g/dL    Bilirubin Total 0.2 <=1.2 mg/dL    Alkaline Phosphatase 53 40 - 150 U/L    AST 25 0 - 45 U/L    ALT 22 0 - 50 U/L    Bilirubin Direct <0.20 0.00 - 0.30 mg/dL   HPV High Risk Types DNA Cervical     Status: None   Result Value Ref Range    Other HR HPV Negative Negative    HPV16 DNA Negative Negative    HPV18 DNA Negative Negative    FINAL DIAGNOSIS       This patient's sample is negative for HPV DNA.        This test was developed and its  performance characteristics determined by the [facility]. It has not been cleared or approved by the FDA. The laboratory is regulated under CLIA as qualified to perform high-complexity testing. This test is used for clinical purposes. It should not be regarded as investigational or for research.    METHODOLOGY: The Roche Inessa 4800 system uses automated extraction, simultaneous amplification of HPV (L1 region) and beta-globin, followed by real time detection of fluorescent labeled HPV and beta globin using specific oligonucleotide probes. The test specifically identifies types HPV 16 DNA and HPV 18 DNA while concurrently detecting the rest of the high risk types (31, 33, 35, 39, 45, 51, 52, 56, 58, 59, 66 or 68).    COMMENTS: This test is not intended for use as a screening device for woman under age 30 with normal cervical cytology. Results should be correlated with cytologic and histologic findings. Close clinical followup is recommended.       Pap Screen with HPV - recommended age 30 - 65 years     Status: None   Result Value Ref Range    Interpretation        Negative for Intraepithelial Lesion or Malignancy (NILM)    Comment         Papanicolaou Test Limitations:  Cervical cytology is a screening test with limited sensitivity, and regular screening is critical for cancer prevention.  Pap tests are primarily effective for the diagnosis/prevention of squamous cell carcinoma, not adenocarcinoma or other cancers.        Specimen Adequacy       Satisfactory for evaluation, endocervical/transformation zone component present    Clinical Information       none      Reflex Testing Yes regardless of result     Previous Abnormal?       Yes      Previous Abnormal Diagnosis       ASC-US, LGSIL      Performing Labs       The technical component of this testing was completed at [facility]    "      ASSESSMENT/PLAN:       ICD-10-CM    1. Routine general medical examination at a health care facility  Z00.00 HEPATITIS A 19+ (HAVRIX/VAQTA)     COVID-19 12+ (2023-24) (PFIZER)     Lipid panel reflex to direct LDL Non-fasting     Hemoglobin A1c     Lipid panel reflex to direct LDL Non-fasting     Hemoglobin A1c      2. Cervical cancer screening  Z12.4 Pap Screen with HPV - recommended age 30 - 65 years     HPV Hold (Lab Only)     HPV High Risk Types DNA Cervical      3. Chronic hepatitis B virus infection (H)  B18.1 AFP tumor marker     US Abdomen Limited     AFP tumor marker     Hepatic panel     Hep B Virus DNA Quant Real Time PCR     Hepatic panel      4. Class 3 severe obesity due to excess calories without serious comorbidity with body mass index (BMI) of 40.0 to 44.9 in adult (H)  E66.01 Adult Comprehensive Weight Management  Referral    Z68.41         Patient overall doing well, no acute concerns. H/o hypertension on amlodipine, at goal today. H/o MDD previously on zoloft, no SI today, doesn't think she is depressed though PHQ 12 today. H/o CINII in 2017, has had several negative co-tests since. Repeat co-testing today again negative, thus can move forward with routine screening. BMI nearly 45, patient interested in weight management/bariatric surgery consult, referral placed. Checked lipids which showed high triglycerides but otherwise unremarkable, and A1c which was normal. H/o chronic hepB: normal LFTs and AFP today, liver ultrasound ordered. Unable to add-on HBV viral load, would be beneficial to check this at subsequent visit. Congratulated on being off vaping over past two months, encouraged ongoing cessation. Received HepA and COVID booster today.     COUNSELING:  Reviewed preventive health counseling, as reflected in patient instructions      BMI:   Estimated body mass index is 44.87 kg/m  as calculated from the following:    Height as of this encounter: 1.676 m (5' 6\").    Weight as of " this encounter: 126.1 kg (278 lb).   Weight management plan: Patient referred to endocrine and/or weight management specialty      She reports that she has quit smoking. Her smoking use included cigarettes and vaping device. She has never used smokeless tobacco.          Anand Meza MD  [facility]

## 2023-12-15 NOTE — PROGRESS NOTES
Preceptor Attestation:  Patient's case reviewed and discussed with the resident, Anand Meza MD, and I personally evaluated the patient. I agree with written assessment and plan of care.    Supervising Physician:  Shu Arguello MD   [facility]

## 2023-12-16 LAB
AFP SERPL-MCNC: <1.8 NG/ML
ALBUMIN SERPL BCG-MCNC: 4.1 G/DL (ref 3.5–5.2)
ALP SERPL-CCNC: 53 U/L (ref 40–150)
ALT SERPL W P-5'-P-CCNC: 22 U/L (ref 0–50)
AST SERPL W P-5'-P-CCNC: 25 U/L (ref 0–45)
BILIRUB DIRECT SERPL-MCNC: <0.2 MG/DL (ref 0–0.3)
BILIRUB SERPL-MCNC: 0.2 MG/DL
CHOLEST SERPL-MCNC: 177 MG/DL
FASTING STATUS PATIENT QL REPORTED: ABNORMAL
HDLC SERPL-MCNC: 36 MG/DL
LDLC SERPL CALC-MCNC: 99 MG/DL
NONHDLC SERPL-MCNC: 141 MG/DL
PROT SERPL-MCNC: 7.5 G/DL (ref 6.4–8.3)
TRIGL SERPL-MCNC: 211 MG/DL

## 2023-12-17 PROBLEM — M17.0 BILATERAL PRIMARY OSTEOARTHRITIS OF KNEE: Status: ACTIVE | Noted: 2023-12-17

## 2023-12-19 LAB
BKR LAB AP GYN ADEQUACY: NORMAL
BKR LAB AP GYN INTERPRETATION: NORMAL
BKR LAB AP HPV REFLEX: NORMAL
BKR LAB AP PREVIOUS ABNL DX: NORMAL
BKR LAB AP PREVIOUS ABNORMAL: NORMAL
PATH REPORT.COMMENTS IMP SPEC: NORMAL
PATH REPORT.COMMENTS IMP SPEC: NORMAL
PATH REPORT.RELEVANT HX SPEC: NORMAL

## 2023-12-20 LAB
HUMAN PAPILLOMA VIRUS 16 DNA: NEGATIVE
HUMAN PAPILLOMA VIRUS 18 DNA: NEGATIVE
HUMAN PAPILLOMA VIRUS FINAL DIAGNOSIS: NORMAL
HUMAN PAPILLOMA VIRUS OTHER HR: NEGATIVE

## 2023-12-27 ENCOUNTER — TELEPHONE (OUTPATIENT)
Dept: FAMILY MEDICINE | Facility: CLINIC | Age: 43
End: 2023-12-27
Payer: COMMERCIAL

## 2023-12-27 NOTE — TELEPHONE ENCOUNTER
Attempted to reach the patient to help them schedule their ultrasound. Left message for patient to call me back.     Sully Simon CMA

## 2023-12-29 ENCOUNTER — OFFICE VISIT (OUTPATIENT)
Dept: FAMILY MEDICINE | Facility: CLINIC | Age: 43
End: 2023-12-29
Payer: COMMERCIAL

## 2023-12-29 VITALS
TEMPERATURE: 98 F | RESPIRATION RATE: 18 BRPM | HEART RATE: 89 BPM | DIASTOLIC BLOOD PRESSURE: 76 MMHG | WEIGHT: 277 LBS | SYSTOLIC BLOOD PRESSURE: 119 MMHG | HEIGHT: 66 IN | BODY MASS INDEX: 44.52 KG/M2 | OXYGEN SATURATION: 97 %

## 2023-12-29 DIAGNOSIS — I10 PRIMARY HYPERTENSION: ICD-10-CM

## 2023-12-29 DIAGNOSIS — F32.5 MAJOR DEPRESSIVE DISORDER WITH SINGLE EPISODE, IN REMISSION (H): ICD-10-CM

## 2023-12-29 DIAGNOSIS — E66.01 CLASS 3 SEVERE OBESITY DUE TO EXCESS CALORIES WITHOUT SERIOUS COMORBIDITY WITH BODY MASS INDEX (BMI) OF 40.0 TO 44.9 IN ADULT (H): Primary | ICD-10-CM

## 2023-12-29 DIAGNOSIS — E66.813 CLASS 3 SEVERE OBESITY DUE TO EXCESS CALORIES WITHOUT SERIOUS COMORBIDITY WITH BODY MASS INDEX (BMI) OF 40.0 TO 44.9 IN ADULT (H): Primary | ICD-10-CM

## 2023-12-29 DIAGNOSIS — Z59.86 FINANCIALLY INSECURE: ICD-10-CM

## 2023-12-29 DIAGNOSIS — B18.1 CHRONIC HEPATITIS B VIRUS INFECTION (H): ICD-10-CM

## 2023-12-29 PROCEDURE — 36415 COLL VENOUS BLD VENIPUNCTURE: CPT

## 2023-12-29 PROCEDURE — 99214 OFFICE O/P EST MOD 30 MIN: CPT | Mod: GC

## 2023-12-29 PROCEDURE — 87517 HEPATITIS B DNA QUANT: CPT

## 2023-12-29 RX ORDER — AMLODIPINE BESYLATE 5 MG/1
5 TABLET ORAL DAILY
Qty: 90 TABLET | Refills: 4 | Status: SHIPPED | OUTPATIENT
Start: 2023-12-29

## 2023-12-29 SDOH — ECONOMIC STABILITY - INCOME SECURITY: FINANCIAL INSECURITY: Z59.86

## 2023-12-29 ASSESSMENT — PATIENT HEALTH QUESTIONNAIRE - PHQ9
SUM OF ALL RESPONSES TO PHQ QUESTIONS 1-9: 5
SUM OF ALL RESPONSES TO PHQ QUESTIONS 1-9: 5
10. IF YOU CHECKED OFF ANY PROBLEMS, HOW DIFFICULT HAVE THESE PROBLEMS MADE IT FOR YOU TO DO YOUR WORK, TAKE CARE OF THINGS AT HOME, OR GET ALONG WITH OTHER PEOPLE: NOT DIFFICULT AT ALL

## 2023-12-29 ASSESSMENT — ANXIETY QUESTIONNAIRES
GAD7 TOTAL SCORE: 0
8. IF YOU CHECKED OFF ANY PROBLEMS, HOW DIFFICULT HAVE THESE MADE IT FOR YOU TO DO YOUR WORK, TAKE CARE OF THINGS AT HOME, OR GET ALONG WITH OTHER PEOPLE?: NOT DIFFICULT AT ALL
5. BEING SO RESTLESS THAT IT IS HARD TO SIT STILL: NOT AT ALL
1. FEELING NERVOUS, ANXIOUS, OR ON EDGE: NOT AT ALL
6. BECOMING EASILY ANNOYED OR IRRITABLE: NOT AT ALL
2. NOT BEING ABLE TO STOP OR CONTROL WORRYING: NOT AT ALL
3. WORRYING TOO MUCH ABOUT DIFFERENT THINGS: NOT AT ALL
4. TROUBLE RELAXING: NOT AT ALL
7. FEELING AFRAID AS IF SOMETHING AWFUL MIGHT HAPPEN: NOT AT ALL
IF YOU CHECKED OFF ANY PROBLEMS ON THIS QUESTIONNAIRE, HOW DIFFICULT HAVE THESE PROBLEMS MADE IT FOR YOU TO DO YOUR WORK, TAKE CARE OF THINGS AT HOME, OR GET ALONG WITH OTHER PEOPLE: NOT DIFFICULT AT ALL
7. FEELING AFRAID AS IF SOMETHING AWFUL MIGHT HAPPEN: NOT AT ALL

## 2023-12-29 NOTE — PATIENT INSTRUCTIONS
We will call you with lab results.   We will try to get weight loss medication approved - we will keep you updated.   Our  will call you.   See you in 1 month!
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
## 2023-12-29 NOTE — PROGRESS NOTES
Preceptor Attestation:   Patient seen, evaluated and discussed with the resident Dr. Anand Meza. I have verified the content of the note, which accurately reflects my assessment of the patient and the plan of care.    Supervising Physician:  Benjamin Rosenstein, MD, MA  [facility]  [facility]

## 2023-12-30 LAB
HBV DNA SERPL NAA+PROBE-ACNC: 857 IU/ML
HBV DNA SERPL NAA+PROBE-LOG IU: 2.9 {LOG_IU}/ML

## 2023-12-31 PROBLEM — F32.5 MAJOR DEPRESSIVE DISORDER WITH SINGLE EPISODE, IN REMISSION (H): Status: ACTIVE | Noted: 2021-03-04

## 2023-12-31 PROBLEM — Z59.86: Status: ACTIVE | Noted: 2023-12-31

## 2023-12-31 PROBLEM — R87.610 PAPANICOLAOU SMEAR OF CERVIX WITH ATYPICAL SQUAMOUS CELLS OF UNDETERMINED SIGNIFICANCE (ASC-US): Status: RESOLVED | Noted: 2017-04-12 | Resolved: 2023-12-31

## 2024-01-03 ENCOUNTER — PATIENT OUTREACH (OUTPATIENT)
Dept: CARE COORDINATION | Facility: CLINIC | Age: 44
End: 2024-01-03
Payer: COMMERCIAL

## 2024-01-03 NOTE — PROGRESS NOTES
Clinic Care Coordination Contact  Follow Up Progress Note      Assessment: There was a care coordination referral put in for the pt for housing, food, and insurance. 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 As for food, I gave her some food shelf in her area for her to go. The pt medical insurance, she stated that she got a letter that its ending soon. I told her that she might have to renew it or apply again. I gave her M[place] assistor in the area to call, and setup a time to meet with them and apply or renew. Pt stated that she will do so, and see. I told her to give me a call, if she needs anything.    Care Gaps:    Health Maintenance Due   Topic Date Due    ADVANCE CARE PLANNING  Never done    DEPRESSION ACTION PLAN  Never done    Pneumococcal Vaccine: Pediatrics (0 to 5 Years) and At-Risk Patients (6 to 64 Years) (1 of 2 - PCV) Never done           Care Plans      Intervention/Education provided during outreach:               Plan:     Care Coordinator will follow up in

## 2024-01-09 ENCOUNTER — ALLIED HEALTH/NURSE VISIT (OUTPATIENT)
Dept: FAMILY MEDICINE | Facility: CLINIC | Age: 44
End: 2024-01-09
Payer: COMMERCIAL

## 2024-01-09 ENCOUNTER — OFFICE VISIT (OUTPATIENT)
Dept: FAMILY MEDICINE | Facility: CLINIC | Age: 44
End: 2024-01-09
Payer: COMMERCIAL

## 2024-01-09 ENCOUNTER — DOCUMENTATION ONLY (OUTPATIENT)
Dept: CARE COORDINATION | Facility: CLINIC | Age: 44
End: 2024-01-09

## 2024-01-09 VITALS
WEIGHT: 270 LBS | HEART RATE: 88 BPM | DIASTOLIC BLOOD PRESSURE: 83 MMHG | TEMPERATURE: 99.4 F | OXYGEN SATURATION: 96 % | SYSTOLIC BLOOD PRESSURE: 139 MMHG | RESPIRATION RATE: 18 BRPM | BODY MASS INDEX: 46.1 KG/M2 | HEIGHT: 64 IN

## 2024-01-09 DIAGNOSIS — Z59.86 FINANCIALLY INSECURE: Primary | ICD-10-CM

## 2024-01-09 DIAGNOSIS — Z59.71 INSURANCE COVERAGE PROBLEMS: ICD-10-CM

## 2024-01-09 DIAGNOSIS — E66.01 CLASS 3 SEVERE OBESITY DUE TO EXCESS CALORIES WITHOUT SERIOUS COMORBIDITY WITH BODY MASS INDEX (BMI) OF 40.0 TO 44.9 IN ADULT (H): ICD-10-CM

## 2024-01-09 DIAGNOSIS — Z59.86 FINANCIALLY INSECURE: ICD-10-CM

## 2024-01-09 DIAGNOSIS — E66.813 CLASS 3 SEVERE OBESITY DUE TO EXCESS CALORIES WITHOUT SERIOUS COMORBIDITY WITH BODY MASS INDEX (BMI) OF 40.0 TO 44.9 IN ADULT (H): ICD-10-CM

## 2024-01-09 DIAGNOSIS — B18.1 CHRONIC HEPATITIS B VIRUS INFECTION (H): Primary | ICD-10-CM

## 2024-01-09 PROCEDURE — 99214 OFFICE O/P EST MOD 30 MIN: CPT | Mod: GC

## 2024-01-09 PROCEDURE — 99207 PR NO CHARGE NURSE ONLY: CPT

## 2024-01-09 SDOH — ECONOMIC STABILITY - INCOME SECURITY: FINANCIAL INSECURITY: Z59.86

## 2024-01-09 NOTE — PROGRESS NOTES
Preceptor Attestation:   Patient seen, evaluated and discussed with the resident. I have verified the content of the note, which accurately reflects my assessment of the patient and the plan of care.  Supervising Physician:Alayna Stauffer MD  [facility]

## 2024-01-09 NOTE — CONFIDENTIAL NOTE
MOSES submitted patient MRN to [facility] FRW team for follow-up regarding patient's health insurance ending 1/31/24.    BELKYS CANALES, TUYET, [facility]

## 2024-01-09 NOTE — PROGRESS NOTES
Clinical Pharmacy Consult:                                                    Genaro Hamilton is a 43 year old female coming in for a clinical pharmacist consult.  She was referred to me from Dr. Franklin.     Reason for Consult: Wegovy injection instructions. Brings to clinic own supply of 0.25 mg/weekly Wegovy. Questions about side effects.     Plan:  1. After review of instructions per , patient performed first injection independently into abdomen. Next time, encouraged to pinch skin prior to injection to create firmer surface to facilitate easier injection. Reviewed most common side effects (nausea/vomiting) and encouraged to inform clinic if occurs and if concerning for patient.     Dinorah Rios, Pharm.D., CDCES [facility]  Phone: [phone]  January 9, 2024 at 10:58 AM
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
## 2024-01-09 NOTE — PROGRESS NOTES
Clinic Care Coordination Contact  Follow Up Progress Note      Assessment:     Patient reported health insurance ends 1/31/24. Patient working with clinic regarding health management and has future appointments scheduled beyond 1/31/24. Patient reported limited income as sole earner in the home. Patient reported [facility] renewed childrens' insurance but not patients. SW to attempt to contact [facility] regarding insurance renewal issue.    Care Gaps:    Health Maintenance Due   Topic Date Due    ADVANCE CARE PLANNING  Never done    DEPRESSION ACTION PLAN  Never done    Pneumococcal Vaccine: Pediatrics (0 to 5 Years) and At-Risk Patients (6 to 64 Years) (1 of 2 - PCV) Never done       Care Gaps Last addressed on this date.    Care Plans    Patient to work with FRW/SW to establish health insurance 2/1/24 forward.    Intervention/Education provided during outreach: FRW referral process; SW follow-up with [facility]    Plan:   Patient to establish health insurance 2/1/24 forward.    Care Coordinator will follow up in approximately one week.    BELKYS CANALES, TUYET, DOROTHYC

## 2024-01-10 ENCOUNTER — PATIENT OUTREACH (OUTPATIENT)
Dept: CARE COORDINATION | Facility: CLINIC | Age: 44
End: 2024-01-10
Payer: COMMERCIAL

## 2024-01-10 NOTE — PROGRESS NOTES
Clinic Care Coordination Contact  Program: Insurance Renewal  County: [facility] Case #:[id number]  [facility] Worker:   Loretta #:   Subscriber #: [id number]  Renewal:  Date Applied:     FRW Outreach:   1/10/24 - Appointment scheduled to assist with calling [facility] to check application. MA was extended to 1/31/24. Follow up [facility]. Coverage for kids were renewed but not patients.   Alina Galvan  Financial Resource Worker  [facility]  Clinic Care Coordination  [phone]        Health Insurance:      Referral/Screening:    patient's health insurance ending 1/31/24.

## 2024-01-12 ENCOUNTER — PATIENT OUTREACH (OUTPATIENT)
Dept: CARE COORDINATION | Facility: CLINIC | Age: 44
End: 2024-01-12
Payer: COMMERCIAL

## 2024-01-12 NOTE — PROGRESS NOTES
Clinic Care Coordination Contact  Program: Insurance Renewal  County: [facility] Case #:[id number]  [facility] Worker:   Loretta #:   Subscriber #: [id number]  Renewal:  Date Applied:     FRW Outreach:   1/12/24 - County requesting if Julito was apart of the household still as he was not reported on household. They will update case. FRW will follow up within 30 days.  Alina Galvan  Financial Resource Worker  [facility] Care Coordination  [phone]    1/10/24 - Appointment scheduled to assist with calling [facility] to check application. MA was extended to 1/31/24. Follow up [facility]. Coverage for kids were renewed but not patients.   Alina Galvan  Financial Resource Worker  [facility]  Clinic Care Coordination  [phone]        Health Insurance:      Referral/Screening:    patient's health insurance ending 1/31/24.

## 2024-01-17 ENCOUNTER — PATIENT OUTREACH (OUTPATIENT)
Dept: CARE COORDINATION | Facility: CLINIC | Age: 44
End: 2024-01-17
Payer: COMMERCIAL

## 2024-01-18 ENCOUNTER — ANCILLARY PROCEDURE (OUTPATIENT)
Dept: ULTRASOUND IMAGING | Facility: CLINIC | Age: 44
End: 2024-01-18
Attending: FAMILY MEDICINE
Payer: COMMERCIAL

## 2024-01-18 DIAGNOSIS — B18.1 CHRONIC HEPATITIS B VIRUS INFECTION (H): ICD-10-CM

## 2024-01-18 PROCEDURE — 76705 ECHO EXAM OF ABDOMEN: CPT | Mod: TC | Performed by: RADIOLOGY

## 2024-01-22 ENCOUNTER — OFFICE VISIT (OUTPATIENT)
Dept: FAMILY MEDICINE | Facility: CLINIC | Age: 44
End: 2024-01-22
Payer: COMMERCIAL

## 2024-01-22 VITALS
DIASTOLIC BLOOD PRESSURE: 86 MMHG | TEMPERATURE: 98.6 F | BODY MASS INDEX: 45.33 KG/M2 | SYSTOLIC BLOOD PRESSURE: 133 MMHG | OXYGEN SATURATION: 96 % | HEART RATE: 81 BPM | HEIGHT: 65 IN | WEIGHT: 272.08 LBS

## 2024-01-22 DIAGNOSIS — F32.5 MAJOR DEPRESSIVE DISORDER WITH SINGLE EPISODE, IN REMISSION (H): ICD-10-CM

## 2024-01-22 DIAGNOSIS — E66.01 CLASS 3 SEVERE OBESITY DUE TO EXCESS CALORIES WITHOUT SERIOUS COMORBIDITY WITH BODY MASS INDEX (BMI) OF 45.0 TO 49.9 IN ADULT (H): Primary | ICD-10-CM

## 2024-01-22 DIAGNOSIS — E66.813 CLASS 3 SEVERE OBESITY DUE TO EXCESS CALORIES WITHOUT SERIOUS COMORBIDITY WITH BODY MASS INDEX (BMI) OF 45.0 TO 49.9 IN ADULT (H): Primary | ICD-10-CM

## 2024-01-22 DIAGNOSIS — M25.562 CHRONIC PAIN OF LEFT KNEE: ICD-10-CM

## 2024-01-22 DIAGNOSIS — G89.29 CHRONIC PAIN OF LEFT KNEE: ICD-10-CM

## 2024-01-22 PROCEDURE — 99214 OFFICE O/P EST MOD 30 MIN: CPT | Mod: GC

## 2024-01-22 NOTE — PROGRESS NOTES
"  Assessment & Plan     Class 3 severe obesity due to excess calories without serious comorbidity with body mass index (BMI) of 45.0 to 49.9 in adult (H)  Patient has been able to  and start Wegovy, affirms two doses so far. Tolerating well, denies any side effects. Continues to make progress in lifestyle changes with both nutrition and exercise. Patient also fortunately now working less hours and thus getting more adequate sleep each night. Will see patient back in about one month for ongoing monitoring. Has initial visit with bariatric surgery scheduled for 3/6/2024.     Major depressive disorder with single episode, in remission   Again expresses some distress regarding financial situation and broken relationship with , but states she is \"staying strong for my children,\" again declining therapy or medication management. Just requesting that clinic SW follow up with her to continue assisting her with any financial resources available.     Chronic pain of left knee  Refill requested. Follows with sports medicine, last saw in December. Recommended PT and encouraged weight loss for offloading.   - diclofenac (VOLTAREN) 1 % topical gel; Apply 4 g topically 4 times daily      Return in about 4 weeks (around 2/19/2024) for Follow up.    Alverto Lam is a 43 year old, presenting for the following health issues:  Knee Pain (Cream for knee pain) and Weight Problem        1/22/2024    10:04 AM   Additional Questions   Roomed by keegan burton   Accompanied by self     HPI   Has taken 2 doses of Wegovy so far. Tolerating well thus far.   Is doing dancing videos regularly now. Mostly focuses on upper body given bilateral knee issues. Eating more veggies and working on portion sizes.   Is cutting down on hours at work now, thus is able to sleep more - affirms 7 hours nightly right now.   Patient again expresses distress regarding financial situation and relationship with , but still not interested " "in therapy. Wants [facility] to call her back to aid in financial resources.     Review of Systems  Constitutional, HEENT, cardiovascular, pulmonary, gi and gu systems are negative, except as otherwise noted.      Objective    /86 (BP Location: Right arm, Patient Position: Sitting, Cuff Size: Adult Large)   Pulse 81   Temp 98.6  F (37  C)   Ht 1.651 m (5' 5\")   Wt 123.4 kg (272 lb 1.3 oz)   LMP 01/17/2024 (Approximate)   SpO2 96%   BMI 45.28 kg/m    Body mass index is 45.28 kg/m .  Physical Exam   GENERAL: appears more rested than previous, alert and no distress  EYES: Eyes grossly normal to inspection, PERRL and conjunctivae and sclerae normal  SKIN: no suspicious lesions or rashes  PSYCH: mentation appears normal, normal affect, depressed mood, becomes tearful        Signed Electronically by: Anand Meza MD    "

## 2024-01-22 NOTE — PATIENT INSTRUCTIONS
Nice seeing you today! Glad things are going well on Wegovy so far.   Keep up the good work with diet changes and increasing physical activity.   We will see you back in about 4 weeks.

## 2024-01-24 ENCOUNTER — TELEPHONE (OUTPATIENT)
Dept: FAMILY MEDICINE | Facility: CLINIC | Age: 44
End: 2024-01-24

## 2024-01-24 NOTE — TELEPHONE ENCOUNTER
[facility] phone call message- medication clarification/question:    Full Medication Name: Semaglutide-Weight Management (WEGOVY) 0.5 MG/0.5ML pen        Question: patient called in stating she has been unable to get this prescription (this particular dosage)filled at [facility] -- they are saying they are out of it completely-- I recommended she call [facility] to see if they have it-- same thing -- they are out of it.  Patient does not know what to do as she is supposed to start this dosage soon. Please call her back to [facility] what she should do     Pharmacy confirmed as      [id number] - [place], [place] - [address] [facility] [address] [address] [address] AT [address] [address] [address]: Yes    OK to leave a message on voice mail? Yes    Primary language: English      needed? No    Call taken on January 24, 2024 at 10:26 AM by Vielka Jones

## 2024-01-26 ENCOUNTER — PATIENT OUTREACH (OUTPATIENT)
Dept: CARE COORDINATION | Facility: CLINIC | Age: 44
End: 2024-01-26
Payer: COMMERCIAL

## 2024-01-26 NOTE — PROGRESS NOTES
I got a call back from the pt, pt stated that she went through our Financial Worker Team, and have not heard anything. I told her to follow with them, she needs to call them and ask them about her medical insurance. Pt stated that she will call them and see.

## 2024-01-26 NOTE — PROGRESS NOTES
Clinic Care Coordination Contact  Follow Up Progress Note      Assessment: I got a message from  about the pt. It seems like he saw the pt, and the pt had some questions about her insurance again. I called the pt, but got her vm, so I left a vm for the pt to give me a call back.     Care Gaps:    Health Maintenance Due   Topic Date Due    ADVANCE CARE PLANNING  Never done    DEPRESSION ACTION PLAN  Never done    Pneumococcal Vaccine: Pediatrics (0 to 5 Years) and At-Risk Patients (6 to 64 Years) (1 of 2 - PCV) Never done           Care Plans      Intervention/Education provided during outreach:               Plan:     Care Coordinator will follow up in

## 2024-02-05 PROBLEM — M17.0 BILATERAL PRIMARY OSTEOARTHRITIS OF KNEE: Status: RESOLVED | Noted: 2023-12-17 | Resolved: 2024-02-05

## 2024-02-06 NOTE — PROGRESS NOTES
DISCHARGE  Reason for Discharge: Patient has failed to schedule further appointments.    Equipment Issued: none    Discharge Plan: Patient to continue home program.  Pt to receive new referral for PT from doctor to schedule more PT appointments.    Referring Provider:  Charito Recio       12/13/23 0503   Appointment Info   Signing clinician's name / credentials Lianna Chang, PT, DPT   Visits Used 1   Medical Diagnosis Bilateral primary osteoarthritis of knee   PT Tx Diagnosis Bilateral knee pain   Quick Adds Certification   Progress Note/Certification   Start of Care Date 12/13/23   Onset of illness/injury or Date of Surgery 12/04/23   Therapy Frequency 1 time a week   Predicted Duration 1 time a week or every other week for a minimum of 10 weeks, minimum of 5 sessions   Certification date from 12/13/23   Certification date to 02/20/24   Progress Note Due Date 12/13/23   Progress Note Completed Date 12/13/23   GOALS   PT Goals 2;3   PT Goal 1   Goal Identifier HEP   Goal Description Pt will be independent with HEP to improve mobility and decrease pain.   Target Date 02/20/24   PT Goal 2   Goal Identifier Walking   Goal Description Pt will report ability to walk greater than 2 blocks with less than 2/10 pain to improve functional mobility and increase tolerance completing ADL's, recreational activities and work related tasks.   Target Date 02/20/24   PT Goal 3   Goal Identifier Stairs   Goal Description Pt will report ability to complete one flight of stairs with less than 2/10 pain to improve functional mobility and increase tolerance completing ADL's, recreational activities and work related tasks.   Target Date 02/20/24   Subjective Report   Subjective Report See initial evaluation note   Treatment Interventions (PT)   Interventions Therapeutic Procedure/Exercise   Therapeutic Procedure/Exercise   Therapeutic Procedures: strength, endurance, ROM, flexibillity minutes (34341) 10   Ther Proc 1 To improve  functional mobility: quad set x 10B, seated quad set x 10B, LAQ x 10B, SLR x 10B, seated hamstring stretch x 30 second holds   Skilled Intervention Education on exercises for HEP. Verbal cues, tactile cues and demonstration of correct technique. Pt educated to complete exercises within pain. Pt educated to stop exercises if they cause an increase in pain.   Patient Response/Progress Tolerated well   Eval/Assessments   PT Eval, Low Complexity Minutes (13634) 20   Education   Learner/Method Patient;Demonstration;Pictures/Video   Plan   Home program PTRx   Plan for next session Progress core and hip strengthening; therapeutic exercise: strengthening, ROM, flexibility; neuro re-ed: balance and gait training; STM/massage/manual therapy; joint mobilizations as tolerated   Comments   Comments Pt is a 43 year old female who presents to physical therapy with bilateral knee and leg pain. Pt would benefit from physical therapy to improve ROM, increase strength and decrease pain to improve functional mobility and increase tolerance completing ADL's, recreational activities and work related tasks.   Total Session Time   Timed Code Treatment Minutes 10   Total Treatment Time (sum of timed and untimed services) 30     Lianna Chang, PT, DPT

## 2024-02-06 NOTE — PATIENT INSTRUCTIONS
Welcome to [facility]!      We are grateful that you have chosen us to partner with you on your journey to better health!  We have included some very important information for you to read as you begin your journey towards weight loss surgery. We will discuss parts of this as you move closer to surgery but please reach out if you have any questions or concerns.      Please click on the following links and they will lead you to a printable version of each handout or copy into your browser to view/print at home:    Making Your Decision, Understanding Weight Loss Surgery  [email or web address]    A Roadmap to Your Weight Loss Surgery  [email or web address]    Honoring Choices - Your Rights  [email or web address]    Honoring Choices - [place] Health Care Directive (form that can be filled out)  [email or web address]      Insurance Coverage and Approval for Surgery  Every insurance plan is different.  Many companies approve benefits for surgery, but many have specific requirements that must be completed prior to being approved.    Verification of benefits is the patient's responsibility.  You will be responsible for any charges not covered by your insurance plan - including, but not limited to copays, deductible, out of pocket, any amount over your cap limit, etc.  Using the guideline below, please contact your insurance plan (we recommend you call the Customer Service number on the back of your card).      Once all of the requirements for surgery are complete, you will see the surgeon.  Following this visit, we will submit your information to your insurance plan for Prior Authorization.  We strongly encourage you to submit any documentation that supports your weight loss attempts to us.    Questions that are important to ask your insurance company when you call them:    Are [facility] in my network?  Is bariatric surgery  a covered benefit under my policy?  If so, what is my estimated out of pocket expense?  Are there any exclusions or cap limits on my plan for bariatric surgery?  If so, what are they?  What are the criteria necessary to be approved for bariatric surgery?  Do you require medically supervised weight loss visits for approval of surgery?  If yes, for how many months?  Within the last # of years?  Are the following procedures a covered benefit under my plan?  Laparoscopic Gastric Bypass (CPT Code 91088)  Laparoscopic Sleeve Gastrectomy (CPT Code 20815)  Nutrition/Dietitian Consult (CPT Code 07040  Nutrition/Dietitian Reassessment (CPT Code 53970  Psychological Assessment (CPT Codes 25197 & 11592      Initial labs for Bariatric Surgery    Some lab orders were placed by your doctor in the [employer] system and can be drawn at any of our outpatient hospital labs or your clinic. If you do them at one of three hospitals ([facility] in [place], [facility] in [place], [facility] in [place]) you do not need an appointment but if you'd like to do them at a clinic, you will need to schedule an appointment with that clinic.       You will need to be fasting 10-12 hours prior (you can continue to drink water) and should have them drawn sooner verses later so if any corrections need to be made we will have time to address them.      [place]'s lab is open 7 am - 4:30 pm Monday through Friday and 9am-12:30 pm on Saturdays.  [facility]'s lab is open 7 am -4:30 pm Monday through Friday and 8am to 11:30am on Saturday and Sundays.     If you would like them done at a clinic outside the [employer] system, then we will need to know where to fax the orders.   If you have any questions or would like help scheduling a lab appointment at the [facility], please give us a call on the Bariatric Nurse Line at [phone].        [place] [facility] for Bariatric Surgery  Eat 3 nutrient-rich meals each day      Don't skip meals--it will cause you to overeat later in the day! Space meals 4-6 hours apart    Eat around the same times each day to develop a routine eating schedule    Avoid snacking unless physically hungry.   Planned snacks: 1-2 times per day and no more than 150 calories    Eating protein and fiber (vegetables/fruits/whole grains) with meals helps you stay full     Choose foods with less than 10 grams of sugar and 5-10 grams of fat per serving to prevent excess calories and weight gain  Eat protein first    Protein helps with healing, maintaining muscle mass and good nutrition, and reducing hunger     For RNY Gastric Bypass, Sleeve Gastrectomy, and Duodenal Switch: aim for 60-80 grams of protein per day    Eat protein first, then veggies and the fruits or grains  Stop drinking 15-30 minutes before meals and wait 30-45 minutes after eating to drink    Drinking too soon before a meal may cause you to be too full to eat    Drinking too soon after you eat will cause the food to  wash  through your new stomach too quickly--leaving you hungry and likely to eat more    Eat S-[id number]    Take 20-30 minutes to eat each meal by taking small bites, chewing foods to applesauce consistency or 20-30 times before you swallow    Not chewing food properly can block the opening of your pouch--causing pain, nausea, vomiting    Eating foods too fast can delay those full signals and increase overeating   Slow down your eating by smaller plates/bowls, toddler utensils, putting your fork/spoon down between bites and not watching TV/computer during meals!  Make a list of activities to prevent boredom, stress and emotional eating    Go for a walk or lift weights while watching your favorite TV show    Talk to a co-worker or email/call a friend     Keep your hands and mind busy with woodworking, puzzles, knitting or painting your nails    Practice deep breathing or meditation  Drink 64 ounces of 0-Calorie drinks between meals      Water    Zero calorie Propel , Vitamin Water Zero  or [employer] Lifewater , Crystal Light , Sugar-Free Davonte-Aid , and other sugar-free lemonade or flavored mak    Decaffeinated, unsweetened coffee/ tea (1 cup or less per day)   Avoid Caffeine and Carbonation- NO STRAWS    Caffeine can irritate your new pouch, increase risk for ulcers, and can increase your appetite      Carbonation can lead to increased bloating/gas and discomfort   Work up to a total of 30-60 minutes of physical activity most days of the week    Helps with losing weight and preventing weight regain after surgery     Do a combination of cardio (walking/water exercises/biking/swimming) and strength training  Take daily vitamin/mineral supplements after surgery    Daily use of vitamins/minerals is necessary for the rest of your life      Psychological Evaluation for Bariatric Surgery      Why do I need a psychological evaluation before bariatric surgery?     The psychologist's main purpose is to provide the support and education to ensure you have the most successful outcome after surgery.   Preparing for bariatric surgery often involves changing behavior patterns. Working on these changes before surgery allows you to achieve the best results after surgery as some of these behaviors have been entrenched for many years. In addition, your relationship with food may need to change. Psychologists are experts in behavior change and are there to guide and support you as you make these important changes.   A significant life change, like losing a lot of weight, may affect many areas of your life--self-concept, physical activity and relationships with others. Your psychologist will help you prepare to adjust to these changes in a healthy way.   If you have mental health symptoms, relationship struggles, or other challenges, your psychologist will suggest how to best address these concerns so that they do not interfere with your progress toward a healthier  lifestyle.       Is the psychologist looking for reasons to say I can't have surgery?   The goal is to not find specific psychological problems. Instead, the psychologist will be working with your strengths to ensure you have the most optimal outcome after surgery.  There is no expectation that you will have everything figured out already or that you must have  perfect  behaviors before moving forward with surgery. Your psychologist is expecting that you will have some behavior changes that will need to occur concurrent with the surgery.   You can feel comfortable that the psychologist is not there to    you or your habits. The psychologist is there to provide support and encourage your progress.      What should I expect during the evaluation?   During the interview, which could take place over 2 or more sessions, the psychologist will ask about:   -weight history, current eating pattern and fluid intake, and previous attempts at weight loss   -activity level   -psychiatric history  -drug, alcohol and nicotine use   -social and developmental history  -support system   -current stressors and coping mechanisms   -understanding of the risks of surgery   -expectations for outcomes after surgery   You will complete various questionnaires that will focus on coping strategies, eating behaviors, quality of life and adverse childhood experiences in order to provide additional information to inform the assessment.   Your psychologist will likely give you some  homework assignments  to practice as you prepare for surgery. This will be individually tailored to your specific needs.   Your psychologist will talk with you about some of the typical challenges that patients might encounter after surgery and how to prepare for these.   A final report will be generated and any treatment recommendations will be discussed with you and the bariatric team to determine next steps.   If you would like, you may have any support people  (e.g., spouse) join a session of the evaluation to provide additional information.       Bariatric surgery offers a physical  tool  for weight management. Similarly, your work with the psychologist will provide you with some additional emotional and behavioral  tools  as you work toward your healthier lifestyle goals.      Support Group    Support and accountability is an important part of your weight loss journey and maintenance once you reach your health goals.  Because of this, we require that you attend at least one of our support groups before you meet with the surgeon so you get a feel for what they are like and hopefully establish a connection to others who are going through a similar process or have had surgery before so you can ask your questions.    We currently have two options for support group but they are in the virtual format only at this time.  Both groups are using Microsoft Teams for their platform and you can access it through the web or an chente that can be downloaded to a smart phone if you have one.      The Pre- and Post-op Connections Group is on the third Tuesday of the month from 6:30-8pm and is hosted by Rickie Benavides, PhD.  If you are interested in this group, you will need to email him at [email or web address] each month and then he will in turn send you the invitation to join.      We also have a Post-op focused Connections Group the 4th Wednesday of the month from 11am-12pm that is mostly geared toward post-operative patients who are past three months post-op.  This group is hosted by ANGELA Porter, CBN, CIC and you can email her to join the group at [email or web address] each month and she will send you an invite similar to Rickie's group.  If you decide you would like to be a regular attender at this group, we can add you to an automatic invitation list of people.    You can see more information about available support groups that the [facility] offers to our patients  as well by following the link:    The following Support Group information can also be found on our website:  [email or web address]      Please let us know if you have any questions about all the information above and we will be talking more about this during future visits.     Thank you,   [facility] Bariatric Team      Bariatric Task List    Fax:  Please fax all paperwork to: [phone] -     Status:  Is patient a candidate for bariatric surgery?:  patient is a candidate for bariatric surgery -     Cleared to schedule surgeon consult?:  not cleared to schedule surgeon consult -     Status:  surgery evaluation in process -     Surgeon: Any -        Insurance: Insurance:  HP -      Contact insurance to discuss coverage: Needed -       HP Referral:  Completed - submitted 3/6/2024 by Stephanie - must complete 5 phone calls prior to insurance approval.   Other:  Please call Stephanie Eng at [phone] with any insurance related questions -        Patient Info: Initial Weight:  270 lb -     Date of Initial Weight/Height:  3/6/2024 -     Required Weight Loss:  no weight gain -     Surgery Type:  undecided -        Dietician Visits: Structured weight loss required by insurance?:  no structured weight loss required -     Clearance from dietician to see surgeon?:  Needed -     Dietician Notes:    -        Psychological Evaluation: Psych eval:  Needed -     Other:    -        Lab Work: Complete Blood Count:  Completed -     Comprehensive Metabolic Panel:  Completed -     Vitamin D:  Completed -     PTH:  Completed -     Hgb A1c:  Completed - 12/15/2023  5.4    Lipids: Completed -      TSH ([facility], [facility], [place]): Completed -       Ferritin: Completed -       Folate: Completed -     Thiamine: Completed -     Vitamin A: Completed -     Vitamin B12: Completed -     Zinc: Completed -     Other:    -        PCP: Establish care with PCP:  Completed -        Health Maintenance:  "Colonoscopy(> 50 yrs or family hx):  NOT Needed -     Mammogram (> 40 yrs or family hx):  Needed -     Pap Smear (women): Completed - 12/15/23 NIL repeat 3 years   Other:    -        Patient Education:  Information Session:  Completed - 3/5/24   Given \"Making your decision\" handout?:  Yes -     Given \"A Roadmap to you Weight Loss Surgery\" handout?: Yes -     Attended support group?:  Needed - must attend at least once prior to surgery      Additional Surgery Requirements: Review Coag plan:  Completed - standard   Birth control plan:  Completed - abstinence   Gallstone prevention plan (Actigall for 6 months postop): Needed -     Other:   -        Final Tasks:  Before surgery online preop class:  Needed -     After surgery online class:  Needed -     History and Physical: Primary Care Provider -   Needed -     Final labs per clinic: Needed -     Chest xray per clinic: Needed -     Electrocardiogram (ECG) per clinic: Needed -     Schedule postop appointments: Needed -     Other:   -   -        Notes: Final tasks will be ordered and scheduled once surgery is scheduled   -            "

## 2024-02-17 ENCOUNTER — HEALTH MAINTENANCE LETTER (OUTPATIENT)
Age: 44
End: 2024-02-17

## 2024-02-21 ENCOUNTER — OFFICE VISIT (OUTPATIENT)
Dept: FAMILY MEDICINE | Facility: CLINIC | Age: 44
End: 2024-02-21
Payer: COMMERCIAL

## 2024-02-21 VITALS
DIASTOLIC BLOOD PRESSURE: 77 MMHG | WEIGHT: 274 LBS | OXYGEN SATURATION: 96 % | BODY MASS INDEX: 45.65 KG/M2 | SYSTOLIC BLOOD PRESSURE: 135 MMHG | HEIGHT: 65 IN | HEART RATE: 76 BPM | TEMPERATURE: 98.7 F

## 2024-02-21 DIAGNOSIS — Z12.31 VISIT FOR SCREENING MAMMOGRAM: ICD-10-CM

## 2024-02-21 DIAGNOSIS — E66.813 CLASS 3 SEVERE OBESITY DUE TO EXCESS CALORIES WITHOUT SERIOUS COMORBIDITY WITH BODY MASS INDEX (BMI) OF 45.0 TO 49.9 IN ADULT (H): Primary | ICD-10-CM

## 2024-02-21 DIAGNOSIS — E66.01 CLASS 3 SEVERE OBESITY DUE TO EXCESS CALORIES WITHOUT SERIOUS COMORBIDITY WITH BODY MASS INDEX (BMI) OF 45.0 TO 49.9 IN ADULT (H): Primary | ICD-10-CM

## 2024-02-21 DIAGNOSIS — M17.12 PRIMARY OSTEOARTHRITIS OF LEFT KNEE: ICD-10-CM

## 2024-02-21 PROCEDURE — 99214 OFFICE O/P EST MOD 30 MIN: CPT | Mod: GC

## 2024-02-21 NOTE — PATIENT INSTRUCTIONS
I have refilled your medications.   Your initial bariatric surgery appointment is on 3/6/24 at 1PM.   Use Voltaren gel for pain so that we can get you moving again. I would recommend rescheduling with PT too.  Schedule dental visit so that we can get you eating solid foods again.   Someone will call to schedule mammogram.   We will see you back in one month.

## 2024-02-21 NOTE — PROGRESS NOTES
"  Assessment & Plan     Class 3 severe obesity due to excess calories without serious comorbidity with body mass index (BMI) of 45.0 to 49.9 in adult (H)  Patient has not had success getting access to Wegovy 0.5 mg dosing. Affirms her last dose of 0.25 mg was last month, took total of 4 doses. Tolerated well. Has gained an additional couple lbs since last visit unfortunately, due to being less active (per below) and having \"sore tooth\" that she is waiting to get pulled now that she has insurance coverage. Has only been able to tolerate liquid diet, which has been high in sugars/calories. Refilled both doses and counseled patient to continue with 0.25 mg dosing for now if desired. Does have initial visit with surgery regarding bariatric surgery in two weeks, so will follow up with patient in one month to see whether she is a candidate and what decision was made.   - Semaglutide-Weight Management (WEGOVY) 0.5 MG/0.5ML pen; Inject 0.5 mg Subcutaneous once a week  - Semaglutide-Weight Management (WEGOVY) 0.25 MG/0.5ML pen; Inject 0.25 mg Subcutaneous once a week    Primary osteoarthritis of left knee  Patient needing refill, ran out so having more symptoms now. Hasn't been able to be as active, affecting healthy lifestyle. Had to stop PT due to coverage issues which have now been resolved. Per chart review, needing new referral in order to schedule next session since so much time had passed.   - diclofenac (VOLTAREN) 1 % topical gel; Apply 4 g topically 4 times daily  - Physical Therapy  Referral    Visit for screening mammogram  - MA SCREENING DIGITAL BILAT - Future  (s+30); Future      Return in about 4 weeks (around 3/20/2024) for Follow up, with me.    Alverto Lam is a 43 year old, presenting for the following health issues:  Recheck Medication (Sangvolataren)    HPI   Doing well.   Did 1 month worth of 0.25 Wegovy. Wasn't able to  0.5 dosing.   Hasn't been able to do as much exercise " "because couldn't  Voltaren. Arthritis in L knee. Had to stop PT due to insurance issue. Now resolved.   Has tooth ache so isn't able to eat hard solids so drinking lot of fluids for two weeks now. Has gained weight again unfortunately. Needs to reschedule dental visit. Insurance just sent card so she can get procedure covered.   Mood: better. Has been able to reduce work hours. 6-8+ hours sleep now.     Review of Systems  Constitutional, HEENT, cardiovascular, pulmonary, gi and gu systems are negative, except as otherwise noted.      Objective    /77   Pulse 76   Temp 98.7  F (37.1  C) (Oral)   Ht 1.66 m (5' 5.35\")   Wt 124.3 kg (274 lb)   LMP 01/17/2024 (Approximate)   SpO2 96%   BMI 45.10 kg/m    Body mass index is 45.1 kg/m .  Physical Exam   GENERAL: alert and no distress, appears well rested  EYES: Eyes grossly normal to inspection, PERRL and conjunctivae and sclerae normal  SKIN: no suspicious lesions or rashes  NEURO: Normal strength and tone, mentation intact and speech normal  PSYCH: mentation appears normal, affect normal/bright        Signed Electronically by: Anand Meza MD    "

## 2024-02-22 NOTE — PROGRESS NOTES
Preceptor Attestation:  Patient's case reviewed and discussed with Anand Meza MD resident and I evaluated the patient. I agree with written assessment and plan of care.  Supervising Physician:  Julito Ly MD, MD [facility]

## 2024-03-06 ENCOUNTER — OFFICE VISIT (OUTPATIENT)
Dept: SURGERY | Facility: CLINIC | Age: 44
End: 2024-03-06
Payer: COMMERCIAL

## 2024-03-06 ENCOUNTER — DOCUMENTATION ONLY (OUTPATIENT)
Dept: SURGERY | Facility: CLINIC | Age: 44
End: 2024-03-06

## 2024-03-06 ENCOUNTER — LAB (OUTPATIENT)
Dept: LAB | Facility: CLINIC | Age: 44
End: 2024-03-06
Payer: COMMERCIAL

## 2024-03-06 VITALS
DIASTOLIC BLOOD PRESSURE: 78 MMHG | HEIGHT: 65 IN | SYSTOLIC BLOOD PRESSURE: 120 MMHG | BODY MASS INDEX: 44.98 KG/M2 | WEIGHT: 270 LBS

## 2024-03-06 DIAGNOSIS — E88.810 METABOLIC SYNDROME: ICD-10-CM

## 2024-03-06 DIAGNOSIS — H50.00 ESOTROPIA: ICD-10-CM

## 2024-03-06 DIAGNOSIS — E66.01 MORBID OBESITY (H): Primary | ICD-10-CM

## 2024-03-06 DIAGNOSIS — E66.813 CLASS 3 SEVERE OBESITY DUE TO EXCESS CALORIES WITHOUT SERIOUS COMORBIDITY WITH BODY MASS INDEX (BMI) OF 40.0 TO 44.9 IN ADULT (H): ICD-10-CM

## 2024-03-06 DIAGNOSIS — E66.01 MORBID OBESITY (H): ICD-10-CM

## 2024-03-06 DIAGNOSIS — E66.01 CLASS 3 SEVERE OBESITY DUE TO EXCESS CALORIES WITHOUT SERIOUS COMORBIDITY WITH BODY MASS INDEX (BMI) OF 40.0 TO 44.9 IN ADULT (H): ICD-10-CM

## 2024-03-06 DIAGNOSIS — E78.5 DYSLIPIDEMIA: ICD-10-CM

## 2024-03-06 PROBLEM — Z63.9 RELATIONSHIP PROBLEMS: Status: ACTIVE | Noted: 2024-03-06

## 2024-03-06 LAB
ERYTHROCYTE [DISTWIDTH] IN BLOOD BY AUTOMATED COUNT: 12.9 % (ref 10–15)
FOLATE SERPL-MCNC: 18 NG/ML (ref 4.6–34.8)
HCT VFR BLD AUTO: 38.9 % (ref 35–47)
HGB BLD-MCNC: 12.8 G/DL (ref 11.7–15.7)
MCH RBC QN AUTO: 29.3 PG (ref 26.5–33)
MCHC RBC AUTO-ENTMCNC: 32.9 G/DL (ref 31.5–36.5)
MCV RBC AUTO: 89 FL (ref 78–100)
PLATELET # BLD AUTO: 290 10E3/UL (ref 150–450)
RBC # BLD AUTO: 4.37 10E6/UL (ref 3.8–5.2)
WBC # BLD AUTO: 11.3 10E3/UL (ref 4–11)

## 2024-03-06 PROCEDURE — 83970 ASSAY OF PARATHORMONE: CPT

## 2024-03-06 PROCEDURE — 36415 COLL VENOUS BLD VENIPUNCTURE: CPT

## 2024-03-06 PROCEDURE — 82746 ASSAY OF FOLIC ACID SERUM: CPT

## 2024-03-06 PROCEDURE — 80048 BASIC METABOLIC PNL TOTAL CA: CPT

## 2024-03-06 PROCEDURE — 84630 ASSAY OF ZINC: CPT | Mod: 90

## 2024-03-06 PROCEDURE — 85027 COMPLETE CBC AUTOMATED: CPT

## 2024-03-06 PROCEDURE — 84590 ASSAY OF VITAMIN A: CPT | Mod: 90

## 2024-03-06 PROCEDURE — 82607 VITAMIN B-12: CPT

## 2024-03-06 PROCEDURE — 82728 ASSAY OF FERRITIN: CPT

## 2024-03-06 PROCEDURE — 99000 SPECIMEN HANDLING OFFICE-LAB: CPT

## 2024-03-06 PROCEDURE — 82306 VITAMIN D 25 HYDROXY: CPT

## 2024-03-06 PROCEDURE — 84425 ASSAY OF VITAMIN B-1: CPT | Mod: 90

## 2024-03-06 PROCEDURE — 84443 ASSAY THYROID STIM HORMONE: CPT

## 2024-03-06 PROCEDURE — 99204 OFFICE O/P NEW MOD 45 MIN: CPT | Performed by: FAMILY MEDICINE

## 2024-03-06 RX ORDER — CALCIUM CARBONATE/VITAMIN D3 600 MG-10
1 TABLET ORAL DAILY
COMMUNITY

## 2024-03-06 RX ORDER — SEMAGLUTIDE 2.4 MG/.75ML
2.4 INJECTION, SOLUTION SUBCUTANEOUS WEEKLY
Qty: 9 ML | Refills: 3 | Status: SHIPPED | OUTPATIENT
Start: 2024-03-06 | End: 2025-03-06

## 2024-03-06 RX ORDER — SEMAGLUTIDE 1.7 MG/.75ML
1.7 INJECTION, SOLUTION SUBCUTANEOUS WEEKLY
Qty: 3 ML | Refills: 0 | Status: SHIPPED | OUTPATIENT
Start: 2024-03-06 | End: 2024-04-03

## 2024-03-06 RX ORDER — SEMAGLUTIDE 0.5 MG/.5ML
0.5 INJECTION, SOLUTION SUBCUTANEOUS WEEKLY
Qty: 2 ML | Refills: 0 | Status: SHIPPED | OUTPATIENT
Start: 2024-03-06 | End: 2024-04-03

## 2024-03-06 RX ORDER — SEMAGLUTIDE 1 MG/.5ML
1 INJECTION, SOLUTION SUBCUTANEOUS WEEKLY
Qty: 2 ML | Refills: 0 | Status: SHIPPED | OUTPATIENT
Start: 2024-03-06 | End: 2024-04-03

## 2024-03-06 ASSESSMENT — SLEEP AND FATIGUE QUESTIONNAIRES
HOW LIKELY ARE YOU TO NOD OFF OR FALL ASLEEP WHEN YOU ARE A PASSENGER IN A CAR FOR AN HOUR WITHOUT A BREAK: MODERATE CHANCE OF DOZING
HOW LIKELY ARE YOU TO NOD OFF OR FALL ASLEEP WHILE LYING DOWN TO REST IN THE AFTERNOON WHEN CIRCUMSTANCES PERMIT: MODERATE CHANCE OF DOZING
HOW LIKELY ARE YOU TO NOD OFF OR FALL ASLEEP WHILE SITTING INACTIVE IN A PUBLIC PLACE: MODERATE CHANCE OF DOZING
HOW LIKELY ARE YOU TO NOD OFF OR FALL ASLEEP WHILE WATCHING TV: MODERATE CHANCE OF DOZING
HOW LIKELY ARE YOU TO NOD OFF OR FALL ASLEEP IN A CAR, WHILE STOPPED FOR A FEW MINUTES IN TRAFFIC: MODERATE CHANCE OF DOZING
HOW LIKELY ARE YOU TO NOD OFF OR FALL ASLEEP WHILE SITTING QUIETLY AFTER LUNCH WITHOUT ALCOHOL: MODERATE CHANCE OF DOZING
HOW LIKELY ARE YOU TO NOD OFF OR FALL ASLEEP WHILE SITTING AND TALKING TO SOMEONE: MODERATE CHANCE OF DOZING
HOW LIKELY ARE YOU TO NOD OFF OR FALL ASLEEP WHILE SITTING AND READING: MODERATE CHANCE OF DOZING

## 2024-03-06 NOTE — PROGRESS NOTES
"New Bariatric Surgery Consultation Note    2024    RE: Genaro Hamilton  MR#: [id number]  : 1980      Referring provider:       3/6/2024     1:11 PM   --   Who referred you doctor       Chief Complaint/Reason for visit: evaluation for possible weight loss surgery    Dear Dr. Meza    I had the pleasure of seeing your patient, Genaro Hamilton, to evaluate her obesity and consider her for possible weight loss surgery. As you know, Genaro Hamilton is 43 year old.  She has a height of 5' 4.5\", a weight of 270 lbs 0 oz, and calculated Body mass index is 45.63 kg/m .        HISTORY OF PRESENT ILLNESS:      3/6/2024     1:11 PM   Weight Loss History Reviewed with Patient   What is the most that you have ever weighed 283   What is the most weight you have lost? 270   I have tried the following methods to lose weight Watching portions or calories    Exercise    Weight Watchers   I have tried the following weight loss medications? (Check all that apply) None       CO-MORBIDITIES OF OBESITY INCLUDE:      3/6/2024     1:11 PM   --   I have the following health issues associated with obesity High Blood Pressure       PAST MEDICAL HISTORY:  Past Medical History:   Diagnosis Date    Abnormal Pap smear of cervix 10/14/2014    LSIL, +HPV    Benign essential hypertension     Dyslipidemia     Esotropia     Hepatitis B carrier (H)     History of malaria     Metabolic syndrome     Relationship problems        PAST SURGICAL HISTORY:  Past Surgical History:   Procedure Laterality Date    LEEP TX, CERVICAL         FAMILY HISTORY:   Family History   Problem Relation Age of Onset    No Known Problems Mother     No Known Problems Father     No Known Problems Sister     No Known Problems Sister     No Known Problems Sister     Diabetes No family hx of     Coronary Artery Disease No family hx of     Hypertension No family hx of     Breast Cancer No family hx of     Colon Cancer No family hx of     Prostate Cancer No " family hx of     Other Cancer No family hx of     Cerebrovascular Disease No family hx of     Asthma No family hx of        SOCIAL HISTORY:       3/6/2024     1:11 PM   Social History Questions Reviewed With Patient   If you work, 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 family and friends   Who can you count on for support throughout your weight loss surgery journey? family and friends       HABITS:      3/6/2024     1:11 PM   --   How often do you drink alcohol? Monthly or less   If you do drink alcohol, how many drinks might you have in a day? (one drink = 5 oz. wine, 1 can/bottle of beer, 1 shot liquor) 1 or 2   Do you currently use any of the following Nicotine products? No   Have you ever used any of the following nicotine products? No   Have you or are you currently using street drugs or prescription strength medication for which you do not have a prescription for? No   Do you have a history of chemical dependency (alcohol or drug abuse)? No       PSYCHOLOGICAL HISTORY:       3/6/2024     1:11 PM   Psychological History Reviewed With Patient   Have you ever attempted suicide? Never.   Have you had thoughts of suicide in the past year? No   Have you ever been hospitalized for mental illness or a suicide attempt? Never.   Do you have a history of chronic pain? No   Have you ever been diagnosed with fibromyalgia? No   Are you currently being treated for any of the following? (select all that apply) I do not have a mental illness       ROS:      3/6/2024     1:11 PM   --   Skin None of the above   HEENT None of these   Musculoskeletal Arthritis   Cardiovascular None of the above   Pulmonary None of the above   Gastrointestinal None of the above   Genitourinary None of the above   Hematological None of the above   Neurological None of the above   Female only None  of the above       EATING BEHAVIORS:      3/6/2024     1:11 PM   --   Do you currently binge eat (eat a large amount of food in a short time)? No   Are you an emotional eater? Yes   Do you get up to eat after falling asleep? Yes   Can you afford 3 meals a day? Yes   Can you afford 50-60 dollars a month for vitamins? Yes       EXERCISE:      3/6/2024     1:11 PM   --   How often do you exercise? 3 to 4 times per week   What is the duration of your exercise (in minutes)? 20 Minutes   What types of exercise do you do? walking    climbing stairs at work   What keeps you from being more active? I am as active as I can possbily be       MEDICATIONS:  Current Outpatient Medications   Medication Sig Dispense Refill    amLODIPine (NORVASC) 5 MG tablet Take 1 tablet (5 mg) by mouth daily 90 tablet 4    calcium carbonate-vitamin D (CALTRATE) 600-10 MG-MCG per tablet Take 1 tablet by mouth daily      COLLAGEN PO Take by mouth daily      diclofenac (VOLTAREN) 1 % topical gel Apply 4 g topically 4 times daily 350 g 1    Semaglutide-Weight Management (WEGOVY) 0.5 MG/0.5ML pen Inject 0.5 mg Subcutaneous once a week for 28 days Please notify patient when available. Thank you 2 mL 0    Semaglutide-Weight Management (WEGOVY) 1 MG/0.5ML pen Inject 1 mg Subcutaneous once a week for 28 days Please notify patient when available. Thank you 2 mL 0    Semaglutide-Weight Management (WEGOVY) 1.7 MG/0.75ML pen Inject 1.7 mg Subcutaneous once a week for 28 days Please notify patient when available. Thank you 3 mL 0    Semaglutide-Weight Management (WEGOVY) 2.4 MG/0.75ML pen Inject 2.4 mg Subcutaneous once a week Please notify patient when available. Thank you 9 mL 3    Semaglutide-Weight Management (WEGOVY) 0.25 MG/0.5ML pen Inject 0.25 mg Subcutaneous once a week (Patient not taking: Reported on 3/6/2024) 2 mL 0    Semaglutide-Weight Management (WEGOVY) 0.5 MG/0.5ML pen Inject 0.5 mg Subcutaneous once a week (Patient not taking: Reported on  "3/6/2024) 2 mL 0       ALLERGIES:  Allergies   Allergen Reactions    Nka [No Known Allergies]        LABS/IMAGING/MEDICAL RECORDS REVIEWED    PHYSICAL EXAM:  /78   Ht 1.638 m (5' 4.5\")   Wt 122.5 kg (270 lb)   LMP 01/17/2024 (Approximate)   BMI 45.63 kg/m    Pleasant and in no distress  Neck 15\"  Heart  Lungs  Abdominal circumference 54\"  Extremities      In summary, [place] HTN, dyslipidemia, metabolic syndrome in the setting of morbid obesity. Her Body mass index is 45.63 kg/m ..This satisfies NIH criteria for bariatric surgery. Once Genaro has been cleared by our bariatric psychologist and our bariatric dietitian, completed initial lab work, attended one support group, and satisfied insurance mandated visits if applicable, she  will be scheduled with  Dr. Finley or Dr. Bentley for Bariatric Surgery Consultation. She is interested in the sleeve, RYGB or NATHANAEL.  Genaro understands that routine health care maintenance will need to be up to date prior to surgery. She verbalizes understanding of the process to surgery and expectations for the postoperative period including the need for lifelong lifestyle changes, vitamin supplementation, and laboratory monitoring.       Weight will need to be 270# prior to submitting for insurance approval.  Standard DVT protocol  Ursodiol for 6 months after surgery to prevent gallstone formation  Pap 12/2023. Mammo scheduled  Genaro was reminded to prevent pregnancy for 18-24 months post operatively. She is abstinent          Sincerely,          Cat Kothari MD     Total time spent on the date of this encounter doing: chart review, review of test results, patient visit, physical exam, education, counseling, developing plan of care, and documenting = 45 minutes.     "

## 2024-03-06 NOTE — LETTER
"    3/6/2024         RE: Genaro Hamilton  [address]  [place] [place] [facility] [address]        Dear Colleague,    Thank you for referring your patient, Genaro Hamilton, to the [facility] [place]. Please see a copy of my visit note below.    New Bariatric Surgery Consultation Note    2024    RE: Genaro Hamilton  MR#: [id number]  : 1980      Referring provider:       3/6/2024     1:11 PM   --   Who referred you doctor       Chief Complaint/Reason for visit: evaluation for possible weight loss surgery    Dear Dr. Meza    I had the pleasure of seeing your patient, Genaro Hamilton, to evaluate her obesity and consider her for possible weight loss surgery. As you know, Genaro Hamilton is 43 year old.  She has a height of 5' 4.5\", a weight of 270 lbs 0 oz, and calculated Body mass index is 45.63 kg/m .        HISTORY OF PRESENT ILLNESS:      3/6/2024     1:11 PM   Weight Loss History Reviewed with Patient   What is the most that you have ever weighed 283   What is the most weight you have lost? 270   I have tried the following methods to lose weight Watching portions or calories    Exercise    Weight Watchers   I have tried the following weight loss medications? (Check all that apply) None       CO-MORBIDITIES OF OBESITY INCLUDE:      3/6/2024     1:11 PM   --   I have the following health issues associated with obesity High Blood Pressure       PAST MEDICAL HISTORY:  Past Medical History:   Diagnosis Date     Abnormal Pap smear of cervix 10/14/2014    LSIL, +HPV     Benign essential hypertension      Dyslipidemia      Esotropia      Hepatitis B carrier (H)      History of malaria      Metabolic syndrome      Relationship problems        PAST SURGICAL HISTORY:  Past Surgical History:   Procedure Laterality Date     LEEP TX, CERVICAL         FAMILY HISTORY:   Family History   Problem Relation Age of Onset     No Known Problems Mother      No Known " Problems Father      No Known Problems Sister      No Known Problems Sister      No Known Problems Sister      Diabetes No family hx of      Coronary Artery Disease No family hx of      Hypertension No family hx of      Breast Cancer No family hx of      Colon Cancer No family hx of      Prostate Cancer No family hx of      Other Cancer No family hx of      Cerebrovascular Disease No family hx of      Asthma No family hx of        SOCIAL HISTORY:       3/6/2024     1:11 PM   Social History Questions Reviewed With Patient   If you work, 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 family and friends   Who can you count on for support throughout your weight loss surgery journey? family and friends       HABITS:      3/6/2024     1:11 PM   --   How often do you drink alcohol? Monthly or less   If you do drink alcohol, how many drinks might you have in a day? (one drink = 5 oz. wine, 1 can/bottle of beer, 1 shot liquor) 1 or 2   Do you currently use any of the following Nicotine products? No   Have you ever used any of the following nicotine products? No   Have you or are you currently using street drugs or prescription strength medication for which you do not have a prescription for? No   Do you have a history of chemical dependency (alcohol or drug abuse)? No       PSYCHOLOGICAL HISTORY:       3/6/2024     1:11 PM   Psychological History Reviewed With Patient   Have you ever attempted suicide? Never.   Have you had thoughts of suicide in the past year? No   Have you ever been hospitalized for mental illness or a suicide attempt? Never.   Do you have a history of chronic pain? No   Have you ever been diagnosed with fibromyalgia? No   Are you currently being treated for any of the following? (select all that apply) I do not have a mental illness       ROS:      3/6/2024      1:11 PM   --   Skin None of the above   HEENT None of these   Musculoskeletal Arthritis   Cardiovascular None of the above   Pulmonary None of the above   Gastrointestinal None of the above   Genitourinary None of the above   Hematological None of the above   Neurological None of the above   Female only None of the above       EATING BEHAVIORS:      3/6/2024     1:11 PM   --   Do you currently binge eat (eat a large amount of food in a short time)? No   Are you an emotional eater? Yes   Do you get up to eat after falling asleep? Yes   Can you afford 3 meals a day? Yes   Can you afford 50-60 dollars a month for vitamins? Yes       EXERCISE:      3/6/2024     1:11 PM   --   How often do you exercise? 3 to 4 times per week   What is the duration of your exercise (in minutes)? 20 Minutes   What types of exercise do you do? walking    climbing stairs at work   What keeps you from being more active? I am as active as I can possbily be       MEDICATIONS:  Current Outpatient Medications   Medication Sig Dispense Refill     amLODIPine (NORVASC) 5 MG tablet Take 1 tablet (5 mg) by mouth daily 90 tablet 4     calcium carbonate-vitamin D (CALTRATE) 600-10 MG-MCG per tablet Take 1 tablet by mouth daily       COLLAGEN PO Take by mouth daily       diclofenac (VOLTAREN) 1 % topical gel Apply 4 g topically 4 times daily 350 g 1     Semaglutide-Weight Management (WEGOVY) 0.5 MG/0.5ML pen Inject 0.5 mg Subcutaneous once a week for 28 days Please notify patient when available. Thank you 2 mL 0     Semaglutide-Weight Management (WEGOVY) 1 MG/0.5ML pen Inject 1 mg Subcutaneous once a week for 28 days Please notify patient when available. Thank you 2 mL 0     Semaglutide-Weight Management (WEGOVY) 1.7 MG/0.75ML pen Inject 1.7 mg Subcutaneous once a week for 28 days Please notify patient when available. Thank you 3 mL 0     Semaglutide-Weight Management (WEGOVY) 2.4 MG/0.75ML pen Inject 2.4 mg Subcutaneous once a week Please notify patient  "when available. Thank you 9 mL 3     Semaglutide-Weight Management (WEGOVY) 0.25 MG/0.5ML pen Inject 0.25 mg Subcutaneous once a week (Patient not taking: Reported on 3/6/2024) 2 mL 0     Semaglutide-Weight Management (WEGOVY) 0.5 MG/0.5ML pen Inject 0.5 mg Subcutaneous once a week (Patient not taking: Reported on 3/6/2024) 2 mL 0       ALLERGIES:  Allergies   Allergen Reactions     Nka [No Known Allergies]        LABS/IMAGING/MEDICAL RECORDS REVIEWED    PHYSICAL EXAM:  /78   Ht 1.638 m (5' 4.5\")   Wt 122.5 kg (270 lb)   LMP 01/17/2024 (Approximate)   BMI 45.63 kg/m    Pleasant and in no distress  Neck 15\"  Heart  Lungs  Abdominal circumference 54\"  Extremities      In summary, Jemmaehas HTN, dyslipidemia, metabolic syndrome in the setting of morbid obesity. Her Body mass index is 45.63 kg/m ..This satisfies NIH criteria for bariatric surgery. Once Genaro has been cleared by our bariatric psychologist and our bariatric dietitian, completed initial lab work, attended one support group, and satisfied insurance mandated visits if applicable, she  will be scheduled with  Dr. Finley or Dr. Bentley for Bariatric Surgery Consultation. She is interested in the sleeve, RYGB or NATHANAEL.  Genaro understands that routine health care maintenance will need to be up to date prior to surgery. She verbalizes understanding of the process to surgery and expectations for the postoperative period including the need for lifelong lifestyle changes, vitamin supplementation, and laboratory monitoring.       Weight will need to be 270# prior to submitting for insurance approval.  Standard DVT protocol  Ursodiol for 6 months after surgery to prevent gallstone formation  Pap 12/2023. Mammo scheduled  Genaro was reminded to prevent pregnancy for 18-24 months post operatively. She is abstinent          Sincerely,          Cat Kothari MD     Total time spent on the date of this encounter doing: chart review, review of test results, " patient visit, physical exam, education, counseling, developing plan of care, and documenting = 45 minutes.       Again, thank you for allowing me to participate in the care of your patient.        Sincerely,        Cat Kothari MD

## 2024-03-06 NOTE — PROGRESS NOTES
I have submitted a referral to [facility] for this patient to participate in their surgery weight loss phone program.  Must complete 5 calls, be cleared by psych and nutrition and finish the task list prior to seeing the surgeon

## 2024-03-07 LAB
ANION GAP SERPL CALCULATED.3IONS-SCNC: 9 MMOL/L (ref 7–15)
BUN SERPL-MCNC: 11.5 MG/DL (ref 6–20)
CALCIUM SERPL-MCNC: 9.3 MG/DL (ref 8.6–10)
CHLORIDE SERPL-SCNC: 103 MMOL/L (ref 98–107)
CREAT SERPL-MCNC: 0.67 MG/DL (ref 0.51–0.95)
DEPRECATED HCO3 PLAS-SCNC: 24 MMOL/L (ref 22–29)
EGFRCR SERPLBLD CKD-EPI 2021: >90 ML/MIN/1.73M2
FERRITIN SERPL-MCNC: 102 NG/ML (ref 6–175)
GLUCOSE SERPL-MCNC: 74 MG/DL (ref 70–99)
POTASSIUM SERPL-SCNC: 4.2 MMOL/L (ref 3.4–5.3)
PTH-INTACT SERPL-MCNC: 37 PG/ML (ref 15–65)
SODIUM SERPL-SCNC: 136 MMOL/L (ref 135–145)
TSH SERPL DL<=0.005 MIU/L-ACNC: 1.77 UIU/ML (ref 0.3–4.2)
VIT B12 SERPL-MCNC: 517 PG/ML (ref 232–1245)
VIT D+METAB SERPL-MCNC: 47 NG/ML (ref 20–50)

## 2024-03-08 LAB
ANNOTATION COMMENT IMP: NORMAL
RETINYL PALMITATE SERPL-MCNC: 0.05 MG/L
VIT A SERPL-MCNC: 0.52 MG/L
ZINC SERPL-MCNC: 63 UG/DL

## 2024-03-10 LAB — VIT B1 PYROPHOSHATE BLD-SCNC: 182 NMOL/L

## 2024-03-15 ENCOUNTER — THERAPY VISIT (OUTPATIENT)
Dept: PHYSICAL THERAPY | Facility: REHABILITATION | Age: 44
End: 2024-03-15
Attending: FAMILY MEDICINE
Payer: COMMERCIAL

## 2024-03-15 DIAGNOSIS — M17.12 PRIMARY OSTEOARTHRITIS OF LEFT KNEE: Primary | ICD-10-CM

## 2024-03-15 PROCEDURE — 97161 PT EVAL LOW COMPLEX 20 MIN: CPT | Mod: GP | Performed by: PHYSICAL THERAPIST

## 2024-03-15 PROCEDURE — 97110 THERAPEUTIC EXERCISES: CPT | Mod: GP | Performed by: PHYSICAL THERAPIST

## 2024-03-15 ASSESSMENT — ACTIVITIES OF DAILY LIVING (ADL): PLEASE_INDICATE_YOR_PRIMARY_REASON_FOR_REFERRAL_TO_THERAPY:: KNEE

## 2024-03-15 NOTE — PROGRESS NOTES
PHYSICAL THERAPY EVALUATION  Type of Visit: Evaluation    See electronic medical record for Abuse and Falls Screening details.    Subjective       Presenting condition or subjective complaint:    Date of onset: 02/21/24    Relevant medical history:     Dates & types of surgery:      Prior diagnostic imaging/testing results:       Prior therapy history for the same diagnosis, illness or injury: Yes      Reports when she was back home had an accident, fell down, and each year will have symptoms during the cold weather. Sometimes knee will get swollen and can't walk. Both knees are painful, left worse than right. Had PT here in December of 2023, was using a cane at this time. Works overnight shifts, works as a caregiver. Works one week on, one week off.       Living Environment  Social support:     Type of home:     Stairs to enter the home:         Ramp:     Stairs inside the home:         Help at home:    Equipment owned:       Employment:      Hobbies/Interests:      Patient goals for therapy:      Pain assessment:      Objective   KNEE EVALUATION  PAIN:   INTEGUMENTARY (edema, incisions):   POSTURE:   GAIT:  Weightbearing Status:   Assistive Device(s):   Gait Deviations: Base of support decreased  Contralateral pelvic drop during left stance  BALANCE/PROPRIOCEPTION:   WEIGHTBEARING ALIGNMENT:   NON-WEIGHTBEARING ALIGNMENT:   ROM:   (Degrees) Left AROM Left PROM  Right AROM Right PROM   Knee Flexion 110  125    Knee Extension 0  0    Pain:   End feel:     STRENGTH:  SLR: fatigued after 5-6 reps with quad lag  FLEXIBILITY:   SPECIAL TESTS:  left knee pain with valgus stress, equivocal laxity with lachman's and lever test (ACL)  FUNCTIONAL TESTS:  sit to stand with increased forward trunk lean. Unable to perform 30 second sit to stand test due to high levels of pain with single sit to stand  PALPATION:  tenderness left medial joint line  JOINT MOBILITY:     10 meter talk test: 8.57    Assessment & Plan   CLINICAL  IMPRESSIONS  Medical Diagnosis: M17.12 (ICD-10-CM) - Primary osteoarthritis of left knee    Treatment Diagnosis: left knee pain with mobility deficits, functional lower extremity weakness, contralateral pelvic drop during left stance phase   Impression/Assessment: Patient is a 43 year old female with chief complaints of left knee pain.  The following significant findings have been identified: Pain, Decreased ROM/flexibility, Decreased strength, Impaired gait, Impaired muscle performance, and Decreased activity tolerance. These impairments interfere with their ability to perform self care tasks, work tasks, recreational activities, household chores, household mobility, and community mobility as compared to previous level of function. Patient will benefit from skilled physical therapy to address impairments and functional limitations in order to achieve their goals.       Clinical Decision Making (Complexity):  Clinical Presentation: Stable/Uncomplicated  Clinical Presentation Rationale: based on medical and personal factors listed in PT evaluation  Clinical Decision Making (Complexity): Low complexity    PLAN OF CARE  Treatment Interventions:  Interventions: Manual Therapy, Neuromuscular Re-education, Therapeutic Activity, Therapeutic Exercise, Self-Care/Home Management    Long Term Goals     PT Goal 1  Goal Identifier: HEP  Goal Description: Patient will demonstrate >50% compliance with home exercise program to promote independence and progress towards  PT Goal 2  Goal Identifier: 30 sec sit to stand  Goal Description: Patient will achieve a score of 6 or greater repetitions in 30 second sit to stand test to reflect improvements in trunk and lower extremity strength values.  Target Date: 06/07/24  PT Goal 3  Goal Identifier: 10 meter walk test  Goal Description: Patient will improve gait speed during 10 meter walk test by at least 0.2 meters per second compared to baseline to reflect significant improvements in gait  speed and improved mobility  Target Date: 06/07/24      Frequency of Treatment: every other week  Duration of Treatment: 12 weeks    Recommended Referrals to Other Professionals:   Education Assessment:   Learner/Method: Patient    Risks and benefits of evaluation/treatment have been explained.   Patient/Family/caregiver agrees with Plan of Care.     Evaluation Time:     PT Eval, Low Complexity Minutes (06287): 25       Signing Clinician: Skip Mccall PT      [facility]                                                                                   OUTPATIENT PHYSICAL THERAPY      PLAN OF TREATMENT FOR OUTPATIENT REHABILITATION   Patient's Last Name, First Name, Genaro Serrano  KALPANA YOB: 1980   Provider's Name   [facility]   Medical Record No.  [id number]     Onset Date: 02/21/24  Start of Care Date: 03/15/24     Medical Diagnosis:  M17.12 (ICD-10-CM) - Primary osteoarthritis of left knee      PT Treatment Diagnosis:  left knee pain with mobility deficits, functional lower extremity weakness, contralateral pelvic drop during left stance phase Plan of Treatment  Frequency/Duration: every other week/ 12 weeks    Certification date from 03/15/24 to 06/07/24         See note for plan of treatment details and functional goals     Skip Mccall PT                         I CERTIFY THE NEED FOR THESE SERVICES FURNISHED UNDER        THIS PLAN OF TREATMENT AND WHILE UNDER MY CARE     (Physician attestation of this document indicates review and certification of the therapy plan).              Referring Provider:  Julito Ly    Initial Assessment  See Epic Evaluation- Start of Care Date: 03/15/24

## 2024-03-25 ENCOUNTER — OFFICE VISIT (OUTPATIENT)
Dept: FAMILY MEDICINE | Facility: CLINIC | Age: 44
End: 2024-03-25
Payer: COMMERCIAL

## 2024-03-25 VITALS
HEIGHT: 65 IN | HEART RATE: 75 BPM | RESPIRATION RATE: 16 BRPM | BODY MASS INDEX: 45.48 KG/M2 | TEMPERATURE: 98.1 F | WEIGHT: 273 LBS | DIASTOLIC BLOOD PRESSURE: 107 MMHG | OXYGEN SATURATION: 99 % | SYSTOLIC BLOOD PRESSURE: 159 MMHG

## 2024-03-25 DIAGNOSIS — M17.12 PRIMARY OSTEOARTHRITIS OF LEFT KNEE: ICD-10-CM

## 2024-03-25 DIAGNOSIS — E66.01 CLASS 3 SEVERE OBESITY DUE TO EXCESS CALORIES WITHOUT SERIOUS COMORBIDITY WITH BODY MASS INDEX (BMI) OF 45.0 TO 49.9 IN ADULT (H): ICD-10-CM

## 2024-03-25 DIAGNOSIS — I10 PRIMARY HYPERTENSION: ICD-10-CM

## 2024-03-25 DIAGNOSIS — F33.1 MODERATE EPISODE OF RECURRENT MAJOR DEPRESSIVE DISORDER (H): Primary | ICD-10-CM

## 2024-03-25 DIAGNOSIS — E66.813 CLASS 3 SEVERE OBESITY DUE TO EXCESS CALORIES WITHOUT SERIOUS COMORBIDITY WITH BODY MASS INDEX (BMI) OF 45.0 TO 49.9 IN ADULT (H): ICD-10-CM

## 2024-03-25 PROCEDURE — 99214 OFFICE O/P EST MOD 30 MIN: CPT | Mod: GC

## 2024-03-25 NOTE — PROGRESS NOTES
"  Assessment & Plan     Genaro is a 43 year old female with a history of obesity with bariatric surgery referral, metabolic syndrome, HTN, and MDD who presents for a follow up after bariatric surgery appointment. Today's visit primarily focused on her MDD symptoms and plan is to have her start therapy with behavioral health.       Moderate episode of recurrent major depressive disorder (H)  Genaro reports about 1 month of depression following losing her job, as well as stress at home with her spouse  and finances, and her son who has \"fallen in with a bad crowd\" at school. She declines SSRIs at this time but is amenable to trying therapy. Emphasized the important of addressing her mental health given it negatively impacting below issues. Will see patient back in a couple weeks for reassessment. Continue to offer med options.   - Adult Mental Health  Referral    Class 3 severe obesity  Genaro was seen by the bariatric surgery team on 3/6, qualifies for surgery. Needs to see their psychologist and dietician next (scheduled in April). Patient's progress with lifestyle modifications halted by worsening mental health. Up 3# today from several weeks ago. Has not been taking Wegovy, Ok'd her to resume it. I worry that current psychosocial factors will interfere with successful bariatric surgery experience. We will continue to address above issue.     Primary hypertension  Above goal today, 2/2 med non-adherence over past 1+ week. Counseled patient on importance of taking amlodipine daily. Will recheck BP at next visit.     Primary osteoarthritis of left knee  Pain improving after starting therapy, has next session scheduled. Will hopefully be able to tolerate more physical activity now.     Return in about 2 weeks (around 4/8/2024) for Follow up.    Subjective   Genaro is a 43 year old, presenting for the following health issues:  RECHECK (Follow up)        1/22/2024    10:04 AM   Additional Questions   Roomed by " "keegan burton   Accompanied by self     HPI     Bariatric surgery appt went well  Wegovy: not taking, wanted to wait to make sure she should be  Lot of pain in knees > pt helping  Feels like she has more energy  Depression - but now has a new job, had lost previous job.   Stressed, paying the bills and 3 kids. Not getting help from \"alcoholic .\"   A lot of new stress with one of sons, who is getting in trouble at school. Worried he may be stealing money; found [employer] and [employer] gift cards in his dirty pants. Worried that not having good father figure is affecting him.   Has been forgetting to take blood pressure medication because of depression.   Also has not been able to focus on lifestyle modifications.         Objective    BP (!) 159/107   Pulse 75   Temp 98.1  F (36.7  C) (Oral)   Resp 16   Ht 1.64 m (5' 4.57\")   Wt 123.8 kg (273 lb)   SpO2 99%   BMI 46.04 kg/m    Body mass index is 46.04 kg/m .  Physical Exam   GENERAL: alert and no distress, tearful, fatigued  EYES: Eyes grossly normal to inspection, PERRL and conjunctivae and sclerae normal  SKIN: no suspicious lesions or rashes  NEURO: Normal strength and tone, mentation intact and speech normal  PSYCH: mentation appears normal, tearful, anxious, depressed mood        Signed Electronically by: Anand Meza MD    "

## 2024-03-25 NOTE — PATIENT INSTRUCTIONS
Someone will call to schedule therapy.     I will see you back in several weeks.     Please consider bringing your son in so we can see him.

## 2024-03-25 NOTE — PROGRESS NOTES
Preceptor Attestation:  Patient's case reviewed and discussed with the resident, Anand Meza MD, and I personally evaluated the patient. I agree with written assessment and plan of care.    Supervising Physician:  Francy Kenyon MD   [facility]

## 2024-03-26 ENCOUNTER — MYC MEDICAL ADVICE (OUTPATIENT)
Dept: CARE COORDINATION | Facility: CLINIC | Age: 44
End: 2024-03-26
Payer: COMMERCIAL

## 2024-04-08 ENCOUNTER — OFFICE VISIT (OUTPATIENT)
Dept: FAMILY MEDICINE | Facility: CLINIC | Age: 44
End: 2024-04-08
Payer: COMMERCIAL

## 2024-04-08 ENCOUNTER — VIRTUAL VISIT (OUTPATIENT)
Dept: SURGERY | Facility: CLINIC | Age: 44
End: 2024-04-08
Payer: COMMERCIAL

## 2024-04-08 VITALS
HEART RATE: 76 BPM | RESPIRATION RATE: 16 BRPM | TEMPERATURE: 98.3 F | OXYGEN SATURATION: 99 % | HEIGHT: 65 IN | DIASTOLIC BLOOD PRESSURE: 98 MMHG | BODY MASS INDEX: 45.12 KG/M2 | SYSTOLIC BLOOD PRESSURE: 142 MMHG | WEIGHT: 270.8 LBS

## 2024-04-08 DIAGNOSIS — F33.1 MODERATE EPISODE OF RECURRENT MAJOR DEPRESSIVE DISORDER (H): Primary | ICD-10-CM

## 2024-04-08 DIAGNOSIS — I10 PRIMARY HYPERTENSION: ICD-10-CM

## 2024-04-08 DIAGNOSIS — H04.123 DRY EYES: ICD-10-CM

## 2024-04-08 DIAGNOSIS — E66.813 CLASS 3 SEVERE OBESITY DUE TO EXCESS CALORIES WITHOUT SERIOUS COMORBIDITY WITH BODY MASS INDEX (BMI) OF 45.0 TO 49.9 IN ADULT (H): ICD-10-CM

## 2024-04-08 DIAGNOSIS — E66.01 MORBID OBESITY WITH BMI OF 45.0-49.9, ADULT (H): Primary | ICD-10-CM

## 2024-04-08 DIAGNOSIS — E66.01 CLASS 3 SEVERE OBESITY DUE TO EXCESS CALORIES WITHOUT SERIOUS COMORBIDITY WITH BODY MASS INDEX (BMI) OF 45.0 TO 49.9 IN ADULT (H): ICD-10-CM

## 2024-04-08 PROCEDURE — 97802 MEDICAL NUTRITION INDIV IN: CPT | Mod: 95 | Performed by: DIETITIAN, REGISTERED

## 2024-04-08 PROCEDURE — 99214 OFFICE O/P EST MOD 30 MIN: CPT | Mod: GC

## 2024-04-08 RX ORDER — CHLORTHALIDONE 25 MG/1
25 TABLET ORAL DAILY
Qty: 90 TABLET | Refills: 0 | Status: SHIPPED | OUTPATIENT
Start: 2024-04-08

## 2024-04-08 NOTE — LETTER
4/8/2024         RE: Genaro Hamilton  [address]  [place] [place] [address]        Dear Colleague,    Thank you for referring your patient, Genaro Hamilton, to the [facility] [place]. Please see a copy of my visit note below.    Genaro Hamilton is a 43 year old who is being evaluated via a billable video visit.        Initial Structured Weight Loss Supervised Diet Evaluation     Assessment:  Genaro is being seen today for initial RD nutritional evaluation. Patient has been unsuccessful with non-surgical weight loss methods and is interested in bariatric surgery. Today we reviewed current eating habits and level of physical activity, and instructed on the changes that are required for successful bariatric outcomes.    Surgery of interest per pt:  unknown .    Workflow review:  Support Group: Not completed.  Psychology:In progress.  Lab work:Completed.  SWL:HP call program    Weight goal: At or below initial.    Anthropometrics:  Initial weight: 270 lb  BMI: There is no height or weight on file to calculate BMI.   Ideal body weight: 56.6 kg (124 lb 13.9 oz)  Adjusted ideal body weight: 83.1 kg (183 lb 3.8 oz)    Medical History:  Patient Active Problem List   Diagnosis     Chronic hepatitis B virus infection (H)     Class 3 severe obesity due to excess calories without serious comorbidity with body mass index (BMI) of 45.0 to 49.9 in adult (H)     Primary hypertension     Financially insecure     Dyslipidemia     Metabolic syndrome     Esotropia     Relationship problems     Moderate episode of recurrent major depressive disorder (H)        Nutrition History  Diet history: Watching portions or calories, Exercise, Weight Watchers  She states that she is an emotional eater. On days that she is sad, she eats a lot more over the course of a day. On days where she feels good, she doesn't think about food as much    Socioeconomic Status  Who does the grocery  shopping for your household? self  Who prepares your meals at home? self    Diet Recall/Time  Breakfast: Yogurt OR coffee  Lunch: none  Dinner: 7 pm Yogurt and toasted bread    Typical Snacks: nuts, sugar free candy (gummy bears)    Overnight eating: No    Eating out: chinese food (orange chicken) 1-2x per week    Beverages  Water  Homemade beverage mixture      Nutrition Diagnosis (PES statement)    Morbid obesity related to excessive calorie intake as evidenced by Intake of high caloric density foods/beverages (juice, soda, alcohol) at meals and/or snacks; large portions; frequent grazing; Estimated intake that exceeds estimated daily energy intake; Binge eating patterns; Frequent excessive fast food or restaurant intake; and BMI 45.28     Intervention    Nutrition Education:   Provided general overview of diet and lifestyle modifications needed to be a deemed a safe candidate for bariatric surgery.   Educated patient on how to read a food label: choosing foods with than 10 grams fat and 10 grams sugar per serving to avoid dumping syndrome.  Dumping Syndrome: Described the mechanisms of syndrome, symptoms, and prevention tools from a dietary perspective.   Vitamins: Educated on post-op vitamin regimen including MVI+ 18 mg Fe two times a day, calcium citrate 400-600 mg two times a day, 5787-5514 mcg sublingual B12 daily, 5000 IU vitamin D3 daily.     Food/Nutrient Delivery:  Educated patient on eating three meals, with cutting out snacking.  Bariatric Plate: Patient and I discussed the importance of including a lean protein source (20-30 grams/meal), vegetables (included at lunch and dinner), one serving (15g) of carbohydrate, and limited added fat (1 tb/day) at each meal.   Educated patient on how to complete a food journal and benefits of meal planning.   Educated patient on using a protein powder drink as a meal replacement and/or supplement after bariatric surgery.   Discussed importance of adequate hydration  after surgery, with goal of at least 64 oz of fluids/day.  Addressed avoiding all carbonated, caffeinated and sweetened drinks to prepare for bariatric surgery.     Nutrition Counseling:  Mindful eating techniques: Encouraged slow meal pace, chewing foods to applesauce consistency for 20-30 minutes/meal.   Discussed  fluids 30 minutes before, during, and after meal to prevent dumping syndrome and discomfort post bariatric surgery.   Discussed pre/post operative diet progression, post op vitamin regimen, gave review of surgery process.     Instructions/Goals:     Start implementing bariatric surgery lifestyle modifications.    Handouts Provided:   [facility] Bariatric Surgery Nutrition Info    Monitor/Evaluation:  Pt. s target weight: no gain from initial visit, patient verbalized understanding.     Plan for next visit:   (Final Supervised Diet visit with RD) pre/post-op  diet progression, give review of surgery process.    Video-Visit Details    Type of service:  Video Visit    Video Start Time (time video started): 11:02 am    Video End Time (time video stopped): 11:41 am    Originating Location (pt. Location): Home      Distant Location (provider location):  Off-site    Mode of Communication:  Video Conference via [facility]    Physician has received verbal consent for a Video Visit from the patient? Yes      Nickie Tran RD         Again, thank you for allowing me to participate in the care of your patient.        Sincerely,        Nickie Tran RD

## 2024-04-08 NOTE — PATIENT INSTRUCTIONS
Your first therapy session is on 4/26/24 at 9 am - arrive 10 to 15 minutes early to check in.   START chlorthalidone 25 mg daily. Continue taking amlodipine and checking pressures daily.   I sent prescription for natural tears to pharmacy for your dry eyes.

## 2024-04-08 NOTE — PROGRESS NOTES
"Resident/Fellow Attestation   I, Anand Meza MD, was present with the medical/DENISE student who participated in the service and in the documentation of the note.  I have verified the history and personally performed the physical exam and medical decision making.  I agree with the assessment and plan of care as documented in the note.      Follow up regarding MDD, HTN and obesity.   Mood improved after knee OA pain now improved and getting troubled son connected with resources at school. Still agreeable to seeing behavioral health for initial therapy session, now scheduled for 4/26. Still uninterested in med management.   BP still above goal on amlodipine 5 mg. Will add chlorthalidone 25 mg and follow up in 2 weeks. Recent BMP showed normal kidney function. Counseled on importance of birth control if sexually active, does NOT desire more children.   Established with bariatric clinic. Will see their dietician today and psychologist later this month. Plans to move forward with surgery. Down 3lbs today after being able to incorporate more physical activity.     Anand Meza MD  PGY2  Date of Service (when I saw the patient): 04/08/24      Genaro is a 43 year old female with a history of obesity with bariatric surgery referral, metabolic syndrome, HTN, and MDD who presents for a follow up over MDD. Today's visit primarily focused on her MDD symptoms and plan is to have her start therapy with behavioral health.         Moderate episode of recurrent major depressive disorder (H)  Two weeks ago, Genaro reported about 1 month of depression following losing her job, as well as stress at home with her spouse  and finances, and her son who has \"fallen in with a bad crowd\" at school.     These stressors have improved since her last visit and her son is getting therapy at school and she has started to cut out some of his \"bad friends\" by taking away his phone. She is willing to see a psychologist here at [facility].  - first therapy " session scheduled 4/26     Class 3 severe obesity  Genaro was seen by the bariatric surgery team on 3/6, and she qualifies for surgery. She needs to see their psychologist and dietician next. Meeting with the dietician today. Down 3# today from several weeks ago. Has not been taking Wegovy, but has prescription for her pharmacy.      Primary hypertension  Above goal today, Has been taking amlodipine daily. Will start Chlorthalidone 25mg daily. Will recheck BP at next visit.   - Chlorthalidone 25 mg daily.     Primary osteoarthritis of left knee  Pain improving after physical therapy, was able to walk around the mall over the weekend. She reports that increased movement has helped her mood immensely    Dry eyes  Requesting prescription for eye drops.   - natural tears prescription sent    Subjective   Genaro is a 43 year old, presenting for the following health issues:  Hypertension (Bp? No other concern)        4/8/2024     9:00 AM   Additional Questions   Roomed by Brianne         4/8/2024    Information    services provided? No     HPI   She reports she is doing well today. She has been able to have a lot better movement in her left leg and was able to visit the mall and walk around yesterday. This increase has made her mood had much better. She is planning to watch a video of the surgery she is planning to have. She does not have a date for the surgery right now.    She has had increased stress with her Mom in [place] who calls her frequently. She is taking her amlodipine. She normally sees blood pressure readings of 120-130s/90s. She thinks she is doing much better this month in all aspects of her life. She thinks she has lost 5 lb over the past month, but maybe gained some back eating a lot of rice this week. Not interested in a medication for her mood right now. Her mood is more based on what is going on around her and she isn't sure how medications will help that. She continues to have  "some stress with her son and playing lots of bad games on his phone and some of his friends. Her son is now getting therapy at school.    She says she is working over 40 hours a week and not sleeping much right now. She works as a home health aid. She is not currently sexually active and does not desire more children.      Review of Systems  Constitutional, HEENT, cardiovascular, pulmonary, gi and gu systems are negative, except as otherwise noted.      Objective    BP (!) 142/98   Pulse 76   Temp 98.3  F (36.8  C)   Resp 16   Ht 1.647 m (5' 4.84\")   Wt 122.8 kg (270 lb 12.8 oz)   LMP 04/03/2024   SpO2 99%   BMI 45.28 kg/m    Body mass index is 45.28 kg/m .  Physical Exam   GENERAL: alert and no distress, in good spirits  RESP: lungs clear to auscultation - no rales, rhonchi or wheezes  CV: regular rate and rhythm, normal S1 S2, no S3 or S4, no murmur, click or rub, no peripheral edema  MS: no gross musculoskeletal defects noted, no edema  PSYCH: mood less labile today      Familia Terry, MS3    Signed Electronically by: Anand Meza MD    "

## 2024-04-08 NOTE — PROGRESS NOTES
Genaro Hamilton is a 43 year old who is being evaluated via a billable video visit.        Initial Structured Weight Loss Supervised Diet Evaluation     Assessment:  Genaro is being seen today for initial RD nutritional evaluation. Patient has been unsuccessful with non-surgical weight loss methods and is interested in bariatric surgery. Today we reviewed current eating habits and level of physical activity, and instructed on the changes that are required for successful bariatric outcomes.    Surgery of interest per pt:  unknown .    Workflow review:  Support Group: Not completed.  Psychology:In progress.  Lab work:Completed.  SWL:HP call program    Weight goal: At or below initial.    Anthropometrics:  Initial weight: 270 lb  BMI: There is no height or weight on file to calculate BMI.   Ideal body weight: 56.6 kg (124 lb 13.9 oz)  Adjusted ideal body weight: 83.1 kg (183 lb 3.8 oz)    Medical History:  Patient Active Problem List   Diagnosis    Chronic hepatitis B virus infection (H)    Class 3 severe obesity due to excess calories without serious comorbidity with body mass index (BMI) of 45.0 to 49.9 in adult (H)    Primary hypertension    Financially insecure    Dyslipidemia    Metabolic syndrome    Esotropia    Relationship problems    Moderate episode of recurrent major depressive disorder (H)        Nutrition History  Diet history: Watching portions or calories, Exercise, Weight Watchers  She states that she is an emotional eater. On days that she is sad, she eats a lot more over the course of a day. On days where she feels good, she doesn't think about food as much    Socioeconomic Status  Who does the grocery shopping for your household? self  Who prepares your meals at home? self    Diet Recall/Time  Breakfast: Yogurt OR coffee  Lunch: none  Dinner: 7 pm Yogurt and toasted bread    Typical Snacks: nuts, sugar free candy (gummy bears)    Overnight eating: No    Eating out: chinese food (orange chicken)  1-2x per week    Beverages  Water  Homemade beverage mixture      Nutrition Diagnosis (PES statement)    Morbid obesity related to excessive calorie intake as evidenced by Intake of high caloric density foods/beverages (juice, soda, alcohol) at meals and/or snacks; large portions; frequent grazing; Estimated intake that exceeds estimated daily energy intake; Binge eating patterns; Frequent excessive fast food or restaurant intake; and BMI 45.28     Intervention    Nutrition Education:   Provided general overview of diet and lifestyle modifications needed to be a deemed a safe candidate for bariatric surgery.   Educated patient on how to read a food label: choosing foods with than 10 grams fat and 10 grams sugar per serving to avoid dumping syndrome.  Dumping Syndrome: Described the mechanisms of syndrome, symptoms, and prevention tools from a dietary perspective.   Vitamins: Educated on post-op vitamin regimen including MVI+ 18 mg Fe two times a day, calcium citrate 400-600 mg two times a day, 6068-2161 mcg sublingual B12 daily, 5000 IU vitamin D3 daily.     Food/Nutrient Delivery:  Educated patient on eating three meals, with cutting out snacking.  Bariatric Plate: Patient and I discussed the importance of including a lean protein source (20-30 grams/meal), vegetables (included at lunch and dinner), one serving (15g) of carbohydrate, and limited added fat (1 tb/day) at each meal.   Educated patient on how to complete a food journal and benefits of meal planning.   Educated patient on using a protein powder drink as a meal replacement and/or supplement after bariatric surgery.   Discussed importance of adequate hydration after surgery, with goal of at least 64 oz of fluids/day.  Addressed avoiding all carbonated, caffeinated and sweetened drinks to prepare for bariatric surgery.     Nutrition Counseling:  Mindful eating techniques: Encouraged slow meal pace, chewing foods to applesauce consistency for 20-30  minutes/meal.   Discussed  fluids 30 minutes before, during, and after meal to prevent dumping syndrome and discomfort post bariatric surgery.   Discussed pre/post operative diet progression, post op vitamin regimen, gave review of surgery process.     Instructions/Goals:     Start implementing bariatric surgery lifestyle modifications.    Handouts Provided:   [facility] Bariatric Surgery Nutrition Info    Monitor/Evaluation:  Pt. s target weight: no gain from initial visit, patient verbalized understanding.     Plan for next visit:   (Final Supervised Diet visit with RD) pre/post-op  diet progression, give review of surgery process.    Video-Visit Details    Type of service:  Video Visit    Video Start Time (time video started): 11:02 am    Video End Time (time video stopped): 11:41 am    Originating Location (pt. Location): Home      Distant Location (provider location):  Off-site    Mode of Communication:  Video Conference via [facility]    Physician has received verbal consent for a Video Visit from the patient? Yes      Nickie Tran RD

## 2024-04-08 NOTE — PATIENT INSTRUCTIONS
Lifestyle Changes Before and After Weight Loss Surgery: Keys for Success     Diet Guidelines after Weight Loss Surgery

## 2024-04-22 ENCOUNTER — VIRTUAL VISIT (OUTPATIENT)
Dept: SURGERY | Facility: CLINIC | Age: 44
End: 2024-04-22
Payer: COMMERCIAL

## 2024-04-22 ENCOUNTER — OFFICE VISIT (OUTPATIENT)
Dept: FAMILY MEDICINE | Facility: CLINIC | Age: 44
End: 2024-04-22
Payer: COMMERCIAL

## 2024-04-22 VITALS
TEMPERATURE: 98 F | OXYGEN SATURATION: 95 % | HEART RATE: 78 BPM | WEIGHT: 266 LBS | HEIGHT: 65 IN | RESPIRATION RATE: 20 BRPM | SYSTOLIC BLOOD PRESSURE: 133 MMHG | DIASTOLIC BLOOD PRESSURE: 83 MMHG | BODY MASS INDEX: 44.32 KG/M2

## 2024-04-22 DIAGNOSIS — I10 PRIMARY HYPERTENSION: Primary | ICD-10-CM

## 2024-04-22 DIAGNOSIS — E66.01 MORBID OBESITY (H): Primary | ICD-10-CM

## 2024-04-22 DIAGNOSIS — E66.01 CLASS 3 SEVERE OBESITY DUE TO EXCESS CALORIES WITHOUT SERIOUS COMORBIDITY WITH BODY MASS INDEX (BMI) OF 45.0 TO 49.9 IN ADULT (H): ICD-10-CM

## 2024-04-22 DIAGNOSIS — F33.1 MODERATE EPISODE OF RECURRENT MAJOR DEPRESSIVE DISORDER (H): ICD-10-CM

## 2024-04-22 DIAGNOSIS — E66.813 CLASS 3 SEVERE OBESITY DUE TO EXCESS CALORIES WITHOUT SERIOUS COMORBIDITY WITH BODY MASS INDEX (BMI) OF 45.0 TO 49.9 IN ADULT (H): ICD-10-CM

## 2024-04-22 PROCEDURE — 99214 OFFICE O/P EST MOD 30 MIN: CPT | Mod: GC

## 2024-04-22 NOTE — PROGRESS NOTES
Assessment & Plan     Primary hypertension  Blood pressure at goal here today despite not being able to start second agent yet. However, home pressures continue to show DBP >90 on 's cuff. Provided patient her own cuff today and educated on proper use. Given patient is young, will be aggressive with BP management and still get her started on hydrochlorothiazide. (May ultimately come off it in the future as BP is indirectly treated by ongoing weight loss.) Counseled patient to call us if she still has trouble picking new med up from pharmacy today. Will see back in a couple weeks for recheck.     Class 3 severe obesity due to excess calories without serious comorbidity with body mass index (BMI) of 45.0 to 49.9 in adult (H)  Patient down another 4 lb since last visit, a total of 7 lb over past month through lifestyle modifications. Continues to learn helpful tips from bariatric dietician. Has initial visit with bariatric PhD today as she continues to pursue bariatric surgery.     Moderate episode of recurrent major depressive disorder (H)  Continues to endorse mood being improved, though does have a lot of work stress. Has initial visit with Dr. Yuen this coming Friday.       Return in about 4 weeks (around 5/20/2024) for Follow up.    Alverto Lam is a 43 year old, presenting for the following health issues:  RECHECK, Health Maintenance (Mammogram), Menstrual Problem (Twice a month), Physical, and Constipation    HPI   Hasn't been able to  hydrochlorothiazide yet, pharmacy didn't have it ready.   Checking BP everyday. Unsure if accurate because using her 's cuff.   Met with bariatric dietician again, affirms helpful. Has been increasing fruit and veggie intake, and exercising more.   The past few days, more straining, stools are hard. Still having BM everyday. Thinks because she had to stop mushroom coffee, was able to buy more. Affirms drinking adequate water daily and high fiber  "foods. Not interested in Miralax.    Review of Systems  Constitutional, HEENT, cardiovascular, pulmonary, gi and gu systems are negative, except as otherwise noted.      Objective    /83   Pulse 78   Temp 98  F (36.7  C) (Oral)   Resp 20   Ht 1.64 m (5' 4.57\")   Wt 120.7 kg (266 lb)   LMP 04/03/2024   SpO2 95%   BMI 44.86 kg/m    Body mass index is 44.86 kg/m .  Physical Exam   GENERAL: alert and no distress  RESP: lungs clear to auscultation - no rales, rhonchi or wheezes  CV: regular rate and rhythm, normal S1 S2, no murmur, click or rub, no peripheral edema  SKIN: no suspicious lesions or rashes  NEURO: Normal strength and tone, mentation intact and speech normal  PSYCH: mentation appears normal, affect normal/bright        Signed Electronically by: Anand Meza MD    "

## 2024-04-22 NOTE — PATIENT INSTRUCTIONS
Don't forget your therapy appointment with Dr. Yuen here at clinic on Friday 4/26 at 9AM!   Call us if you haven't been able to  your new blood pressure medication (hydrochlorothiazide).   Keep checking your blood pressure everyday and writing down, bring to next visit.
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
## 2024-04-22 NOTE — Clinical Note
Bang Virk,  Patient asked me to help get mammo rescheduled (apparently wasn't able to make it to the bus?).  Could you help facilitate this?  Thanks,  Anand

## 2024-04-26 ENCOUNTER — OFFICE VISIT (OUTPATIENT)
Dept: PSYCHOLOGY | Facility: CLINIC | Age: 44
End: 2024-04-26
Payer: COMMERCIAL

## 2024-04-26 DIAGNOSIS — F33.1 MODERATE EPISODE OF RECURRENT MAJOR DEPRESSIVE DISORDER (H): Primary | ICD-10-CM

## 2024-04-26 PROCEDURE — 90791 PSYCH DIAGNOSTIC EVALUATION: CPT | Performed by: MARRIAGE & FAMILY THERAPIST

## 2024-04-26 NOTE — PROGRESS NOTES
Behavioral Health Diagnostic Assessment:    Client's Legal Name: Genaro Hamilton    Client's Preferred Name: Genaro  YOB: 1980  Type of Service: in-person  Length of Service:   Start time: 9:05AM    End time: 9:55AM  Duration: 50 minutes  Attendees: patient    Assessment Summary:  Diagnostic completed today. Genaro is a 43 year old female who was referred for mental health services for help with depression. Based on Genaro's report of symptoms, she meets criteria for Major Depressive Disorder, Recurrent, Moderate Severity. Genaro's mental health concerns have been affecting her ability to function in areas related to work and family relationships causing clinically significant distress. Patient reported no drug/alcohol use. Genaro is impacted by the following social determinants of health: limited education, job opportunities, and/or income, housing instability, and social isolation. She denied safety concerns (homicidal or suicidal ideation) Based on Genaro's reported symptoms and impact on functioning, the plan is to proceed with psychotherapy on a bi-weekly to monthly basis..    DSM-5-TR Diagnoses:  Major Depressive Disorder, Recurrent, Moderate Severity    Orientation, Recommendations, & Plan:   Rights to and limits to confidentiality were discussed with patient.  Integrated care team and shared chart were discussed with patient and agreed upon.patient  Follow-up with this provider in 2-4 weeks  After Visit Summary was declined    Mental Health Screening Questionnaires:  PHQ-9:       12/15/2023     4:59 PM 12/15/2023     5:01 PM 12/29/2023     3:31 PM   PHQ   PHQ-9 Total Score 12 12 5   Q9: Thoughts of better off dead/self-harm past 2 weeks Not at all Not at all Not at all       RODGER-7:       12/15/2023     5:01 PM 12/29/2023     3:32 PM   RODGER-7 SCORE   Total Score  0 (minimal anxiety)   Total Score 0 0     PC-PTSD Screener:        No data to display              CAGE-AID:        No data to  display                Current Presenting Problems or Complaints (including patient perception of problem and external factors contributing to current dilemma): Patient presented to therapy today with depressive symptoms (see below), exacerbated by financial stress and loveless marriage. She is motivated to participate in therapy en route allaying said symptoms.     Review of Symptoms:  Depression: low mood / sadness, anhedonia, irritability, fatigue, poor concentration / memory  Autumn: n/a  Psychosis: n/a  Anxiety: rumination / worry about finances (within depressive presentation)  Trauma Symptoms: n/a    Functioning:  Mental health symptoms negatively impaction functioning in areas related to work and family relationships    Patient Strengths and Resources:  Intelligent; motivated to participate in care    Pertinent Social Determinants of Health:   Genaro is impacted by the following social determinants of health: limited education, job opportunities, and/or income and housing instability    Chemical Health History:  Alcohol Use: n/a  Drug Use: n/a  Prescription Misuse: n/a  Tobacco Use: n/a  Caffeine Use: n/a    Patient past attempts to control alcohol/drug use (including informal approaches or formal treatment): N/A  Have there been any consequences related to clients drug use? no.    Previous Mental Health Concerns/Treatment:  Outpatient: None  Inpatient: None  Residential: None    Suicide Assessment:  Recent suicidal thoughts: No  Past suicidal thoughts: No  Any attempts in the past: No  Any family/friends/loved ones die by suicide: No  Plan or considering various methods: No  Access to firearms: No  Protective factors: no plan or intent  Verbal contract for safety: Yes    Non-Suicidal Self Injurious Behavior:   No    Violence/Homicide Risk Assessment:  Problems with anger management: No  History of violence: No  History of significant damage to property: No  Threat made to harm or kill someone: No  Verbal  contract for safety: N/A    Safety Plan:   Genaro was provided with the phone number for emergency mental health services and was encouraged to call these local crisis numbers, 911, or visit a local emergency room if thoughts of suicide or homicide were to arise and/or if they were to be in acute distress. The patient agreed to utilize these services as indicated if the need were to arise. Genaro denied past or current suicidal or homicidal ideation, intent, or plan, denied a history of suicide attempts/self-harming behaviors, and identified no plan / intent as primary protective factor(s) to reduce risk of self-harm or causing harm to others. Based on these factors, Genaro is considered to be sustainable as an outpatient at this time.     Social History and Associated Level of Functioning (See below):    Current Living Arrangements: lives with  and three minor children    Family/Children: see abovec    Intimate Relationship/Marriage: , but patient describes relationship as loveless / disengaged for past 4+ years    Social Connection: isolated    Developmental History: normal    Abuse/Trauma: n/a    Work: unemployed    Education: high school    Legal: n/a    Cultural/Belief System: [facility] Health: See medical chart    Patient Active Problem List   Diagnosis    Chronic hepatitis B virus infection (H)    Class 3 severe obesity due to excess calories without serious comorbidity with body mass index (BMI) of 45.0 to 49.9 in adult (H)    Primary hypertension    Financially insecure    Dyslipidemia    Metabolic syndrome    Esotropia    Relationship problems    Moderate episode of recurrent major depressive disorder (H)     Current Outpatient Medications   Medication Sig Dispense Refill    amLODIPine (NORVASC) 5 MG tablet Take 1 tablet (5 mg) by mouth daily 90 tablet 4    calcium carbonate-vitamin D (CALTRATE) 600-10 MG-MCG per tablet Take 1 tablet by mouth daily      chlorthalidone (HYGROTON) 25  MG tablet Take 1 tablet (25 mg) by mouth daily 90 tablet 0    COLLAGEN PO Take by mouth daily      dextran 70-hypromellose (TEARS NATURALE FREE PF) 0.1-0.3 % ophthalmic solution Place 1 drop into both eyes daily as needed (dry eyes) 35 each 2    diclofenac (VOLTAREN) 1 % topical gel Apply 4 g topically 4 times daily 350 g 1    Semaglutide-Weight Management (WEGOVY) 2.4 MG/0.75ML pen Inject 2.4 mg Subcutaneous once a week Please notify patient when available. Thank you 9 mL 3     No current facility-administered medications for this visit.     Family Health History: See medical chart    NOTE: Diagnostic complete; update due on April 26, 2025    Orlando Yuen, PhD, LMFT    - - - - -    Treatment Plan    Client's Legal Name: Genaro Hamilton    Client's Preferred Name:Genaro  YOB: 1980  Today's Date: April 26, 2024    Date Diagnostic Update Due: April 26, 2025    DSM-V Diagnoses:  Major Depressive Disorder, Recurrent, Moderate Severity    Psychosocial / Contextual Factors:   The patient's mental health concerns have been continuing to affect her ability to function in daily living and have been causing clinically significant distress.        12/15/2023     4:59 PM 12/15/2023     5:01 PM 12/29/2023     3:31 PM   PHQ-9 SCORE   PHQ-9 Total Score MyChart   5 (Mild depression)   PHQ-9 Total Score 12 12 5         12/15/2023     5:01 PM 12/29/2023     3:32 PM   RODGER-7 SCORE   Total Score  0 (minimal anxiety)   Total Score 0 0       Collaboration: Anand Meza    Referral: Anand Meza    Anticipated treatment duration: Unknown  Agreed upon meeting frequency: monthly    Long Term Treatment Goal(s) related to diagnosis / functional impairment(s)  Goal 1: Foluke will improve low / depressed mood (and concomitant symptoms) to an everyday level to an everyday level that is such that said mood does not deleteriously impact everyday functioning.    Steps we will take to achieve your goal:    Fole will  identify and employ a range of self-care behaviors (e.g., physical activity, identifying/challenging/replacing self-defeating cognitions, increasing social-connectedness with family/friends).    Intervention(s)  Therapist will provide support, psychoeducation and homework assignments as needed.    Goal 2: Foluke will improve worry / rumination to an everyday level to an everyday level that is such that said mood does not deleteriously impact everyday functioning.    Steps we will take to achieve your goal:    Foluke will identify and employ a range of self-care behaviors (e.g., self-soothing relaxation / breathing exercises, self-soothing self-talk sequences, identifying/challenging/replacing self-defeating cognitions).    Intervention(s)  Therapist will provide support, psychoeducation and homework assignments as needed.      If you need additional support and care during times that your therapist or PCP are not available, here are some additional resources for you:    Help in a Crisis:    Crisis numbers- available 24 hours a day  [facility] Multilingual Crisis Line:  [phone]  Carley kraus xav tau neeg pab koj es hais lub hmoob no hu tammy tus xov tooj no ua ntej.    [facility]: [phone]     [place] Suicide Prevention Hotline: 988    [facility] (GLBTQ+ youth): 1-592.862.4464 or text: 702398    [place] Crisis Line:  1-122.897.3834 and Press 1 or text: 579533    Deaf or hard of hearing:  Video relay Service - Dial [phone]  TTY - Dial [phone]  Voice/Caption Phone - Dial [phone]    Crisis Walk-In Clinic:  [facility]  [address] [place] [address]   [phone]     Walk-In at [facility] (free counseling- Call first for services in [place]anian or Hmong)   [facility] en espanol.     [address]   M, W 5-7pm  [phone]    Crisis Text Line: Text [place] to 461562. Free support at your fingertips 24/7  People who  text [place] to 807409 will be connected with a counselor. Crisis Text Line is available 24 hours a day, seven days a week.    If you feel at risk of immediate harm, go directly to the Emergency Department.    Patient has reviewed and agreed to the above plan. (Verbally)    Orlando Yuen, PhD  April 26, 2024      ______________________________    ________  Patient Signature       Date    ______________________________    ________  Provider Signature       Date

## 2024-05-08 ENCOUNTER — VIRTUAL VISIT (OUTPATIENT)
Dept: SURGERY | Facility: CLINIC | Age: 44
End: 2024-05-08
Payer: COMMERCIAL

## 2024-05-08 DIAGNOSIS — E66.01 MORBID OBESITY (H): Primary | ICD-10-CM

## 2024-05-08 NOTE — PROGRESS NOTES
Virtual Visit Details    Type of service:  Video Visit   Video Start Time:  2:45 PM  Video End Time:3:31 PM    Originating Location (pt. Location): Home    Distant Location (provider location):  Off-site  Platform used for Video Visit: Jefry Lam has started to make some changes in eating and lifestyle, but still needs to be more mindful about her eating. She will follow up with mindful eating strategies. She also needs to complete all of the psychological testing and this was resent. F50.9; E66.01

## 2024-05-21 ENCOUNTER — VIRTUAL VISIT (OUTPATIENT)
Dept: SURGERY | Facility: CLINIC | Age: 44
End: 2024-05-21
Payer: COMMERCIAL

## 2024-05-21 DIAGNOSIS — E66.813 CLASS 3 SEVERE OBESITY DUE TO EXCESS CALORIES WITHOUT SERIOUS COMORBIDITY WITH BODY MASS INDEX (BMI) OF 40.0 TO 44.9 IN ADULT (H): Primary | ICD-10-CM

## 2024-05-21 DIAGNOSIS — E66.01 MORBID OBESITY (H): ICD-10-CM

## 2024-05-21 DIAGNOSIS — E66.01 CLASS 3 SEVERE OBESITY DUE TO EXCESS CALORIES WITHOUT SERIOUS COMORBIDITY WITH BODY MASS INDEX (BMI) OF 40.0 TO 44.9 IN ADULT (H): Primary | ICD-10-CM

## 2024-05-21 PROCEDURE — 97803 MED NUTRITION INDIV SUBSEQ: CPT | Mod: 95 | Performed by: DIETITIAN, REGISTERED

## 2024-05-21 NOTE — PATIENT INSTRUCTIONS
Protein Supplements  *Avoid protein supplements with caffeine for first 3 months post op     Type Serving Calories Protein Grams Sugar Grams Where Available   Muscle Milk Pro Series Shake 1 bottle  170 32 1 [employer], [employer]   Premier Protein Shake 1 bottle 160 30 1 Dar[employer]  [employer], Target, Cub   Equate High Performance Shake 1 bottle  160 30 1 [employer]   [facility] Nutrition Plan Shake 1 bottle 150 30 2 [employer], [employer]   Ensure Max Protein  Shake 1 bottle 150 30 1 [employer], Target   [facility] Core Power Shake 1 bottle 170 26 5 [employer]   Pure Protein Shake 1 bottle 110-170 23 1  Jeni, [employer], Target   Slim Fast   High Protein Shake 1 bottle 180 20 1 [employer], Target, Grocery/Pharmacy   100kcal Muscle Milk Shake 1 bottle 100 20 0 [employer], Target, [facility],Grocery/Pharmacy   Ruiz's Lift Water 1 bottle 90 20 0 [employer], Target   Isopure Zero Carb Water   bottle 80 20 0 [employer], Vitamin Shoppe, online   Premier Protein Clear Water 1 bottle 90 20 0 [employer]    Unjury Protein+ Powder 1 scoop 90 20 0 www.[email or web address]  wwwMassive   Advantage Shake  1 bottle 160 15-17 1 [employer], Target  Grocery/Pharmacy   100kcal Muscle Milk Powder 1 scoop 100 15 0 [employer], musclemilk.com   Protein 2-0 Water 1 bottle 60-70 15 0 [facility]         Plant-Based Protein Supplements     Type Serving  Calories Protein   Grams Sugar Grams  Where Available    Garden of Life Raw Protein Powder Powder 1 scoop 110 22 0 Whole Foods, Vitamin Shoppe, www.ImmunoPhotonics   Orgain Organic Protein Powder Powder 2 scoops 150 21 0 Target, [employer], [employer]   Planted by Unjury Powder 1 scoop 130 20 3 www.[facility]  www.amazon.com   Protein and Greens by Bella Powder  1 scoop 120 20 0 [employer], Target, Grocery/Pharmacy   Essentials Shake by Bella Powder 1 scoop 130 20 0 [employer], Target, Grocery/Pharmacy     Plant Fusion Orgain Plant Protein Powder 1 scoop 120 20 0 www.plantfusion.net   Orgain Grass Fed Shake Shake 1 bottle 140 20 4 Target,  [employer]   Evolve Shake 1 bottle 150 20 4 Target, [place], [employer]   Sun Warrior Powder 1 scoop 100 17-18 0 [employer], Whole Foods   Whole Foods Fit Protein  Powder 1 scoop 110 17 0 Whole Foods   Garden of Life Plant Protein  Powder 1 scoop 90 15 0 Whole Foods, Vitamin Shoppe,   www.amazon.com     Look for (per serving):  -100-200 calories  -15-30 grams protein   -Less than 10 grams total carbohydrate  -10/10 Rule      LEAN PROTEIN SOURCES    Protein Source Portion Calories Grams of Protein                           Nonfat, plain Greek yogurt    (10 grams sugar or less) 3/4 cup (6 oz)  12-17   Light Yogurt (10 grams sugar or less) 3/4 cup (6 oz)  6-8   Protein Shake 1 shake 110-180 15-30   Skim/1% Milk or lactose-free milk 1 cup ( 8 oz)  8   Plain or light, flavored soymilk 1 cup  7-8   Plain or light, hemp milk 1 cup 110 6   Fat Free or 1% Cottage Cheese 1/2 cup 90 15   Part skim ricotta cheese 1/2 cup 100 14   Part skim or reduced fat cheese slices 1/4cup, 3 dice 65-80 8     Mozzarella String Cheese 1 80 8   Canned tuna, chicken, crab or salmon  (canned in water)  1/2 cup 100 15-20   White fish (broiled, grilled, baked) 3 ounces 100 21   [place]/Tuna (broiled, grilled, baked) 3 ounces 150-180 21   Shrimp, Scallops, Lobster, Crab 3 ounces 100 21   Pork loin, Pork Tenderloin 3 ounces 150 21   Boneless, skinless chicken /turkey breast                          (broiled, grilled, baked) 3 ounces 120 21   [place], [place], [place], and Venison 3 ounces 120 21   Lean cuts of red meat and pork (sirloin,   round, tenderloin, flank, ground 93%-96%) 3 ounces 170 21   Lean or Extra Lean Ground Turkey 1/2 cup 150 20   90-95% Lean [place] Burger 1 almas 140-180 21   Low-fat casserole with lean meat 3/4 cup 200 17   Luncheon Meats                                                        (turkey, lean ham, roast beef, chicken) 3 ounces 100 21   Egg (boiled, poached, scrambled) 1 Egg 60 7   Egg Substitute 1/2 cup 70 10    Nuts (limit to 1 serving per day)  3 Tbsp. 150 7   Nut [place] (peanut, almond)  Limit to 1 serving or less daily 1 Tbsp. 90 4   Soy Burger (varies) 1  10-15   Edamame  1/2 cup ~95 9   Garbanzo, Black, Purcell Beans 1/2 cup 110 7   Refried Beans 1/2 cup 100 7   Kidney and Lima beans 1/2 cup 110 7   Tempeh 3 oz 175 18   Vegan crumbles 1/2 cup 100 14   Tofu 1/2 cup 110 14   Chili (beans and extra lean beef or turkey) 1 cup 200 23   Lentil Stew/Soup 1 cup 150 12   Black Bean Soup 1 cup 175 12       Portion Control Without Measuring     Create a Plate    My Plate    Daily Food and Exercise Log

## 2024-05-21 NOTE — PROGRESS NOTES
Genaro Hamilton is a 43 year old who is being evaluated via a billable video visit.          How would you like to obtain your AVS? MyChart  If the video visit is dropped, the invitation should be resent by: Text to cell phone: [phone]  Will anyone else be joining your video visit? No          Follow Up Surgical Weight Loss Supervised Diet Evaluation    Assessment:  Pt. is being seen today for a follow up RD nutritional evaluation. Pt. has been unsuccessful with non-surgical weight loss methods and is interested in bariatric surgery. Today we reviewed current eating habits and level of physical activity, and instructed on the changes that are required for successful bariatric outcomes.    Surgery of interest per pt: RNY.    Weight Loss Medication: Wegovy - unable to get the next step and has not been taking    Workflow review:  Support Group: Not completed.  Psychology:In progress.  Lab work:Completed.  SWL:No - [facility] call program    Weight goal: At or below initial.    Anthropometrics:  Pt's weight is 262 lbs  Initial weight: 270 lbs  Weight change: 8 lbs down  BMI: 44.19  Ideal body weight: 56 kg (123 lb 7.5 oz)  Adjusted ideal body weight: 81.9 kg (180 lb 7.7 oz)    Medical History:  Patient Active Problem List   Diagnosis    Chronic hepatitis B virus infection (H)    Class 3 severe obesity due to excess calories without serious comorbidity with body mass index (BMI) of 45.0 to 49.9 in adult (H)    Primary hypertension    Financially insecure    Dyslipidemia    Metabolic syndrome    Esotropia    Relationship problems    Moderate episode of recurrent major depressive disorder (H)      Diabetes: no    Progress over past month: Patient will fast and only eat once per day. Patient has been trying to increase her protein intake - yogurt, cottage cheese, protein shakes. Discussed the importance of protein intake with every meal. Discussed the importance of consistency with meals to help fuel weight loss. Encouraged  patient to follow the bariatric plate method.     Diet Recall/Time  Breakfast: cereal (oatmeal) OR pancakes OR cottage cheese OR skips and only has coffee  Lunch (1-3 PM): smoothies with protein powder OR varies  Dinner: carrot and cabbage soup    Typical Snacks: sara    Overnight eating: No    Eating out: <1x/week    Meal duration: 30 or more minutes.      fluids by 30 minutes before, during meal, and waiting 30 minutes after meal before drinking fluids: No - has been working on this    Beverages  Water  Sparkling water  Coffee  Tea    Exercise  Dancing  Walking     PES statement:   Morbid obesity related to excessive energy intake as Estimated intake that exceeds estimated daily energy intake; and BMI 44.19       Intervention    Nutrition Education:   Provided general overview of diet and lifestyle modifications needed to be a deemed a safe candidate for bariatric surgery.   Educated patient on how to read a food label: choosing foods with than 10 grams fat and 10 grams sugar per serving to avoid dumping syndrome.  Dumping Syndrome: Described the mechanisms of syndrome, symptoms, and prevention tools from a dietary perspective.   Vitamins: Educated on post-op vitamin regimen including MVI+ 18 mg Fe two times a day, calcium citrate 400-600 mg two times a day, 5643-3350 mcg sublingual B12 daily, 5000 IU vitamin D3 daily.     Food/Nutrient Delivery:  Educated patient on eating three meals, with cutting out snacking.  Bariatric Plate: Patient and I discussed the importance of including a lean protein source (20-30 grams/meal), vegetables (included at lunch and dinner), one serving (15g) of carbohydrate, and limited added fat (1 tb/day) at each meal.   Educated patient on how to complete a food journal and benefits of meal planning.   Educated patient on using a protein powder drink as a meal replacement and/or supplement after bariatric surgery.   Discussed importance of adequate hydration after surgery,  with goal of at least 64 oz of fluids/day.  Addressed avoiding all carbonated, caffeinated and sweetened drinks to prepare for bariatric surgery.     Nutrition Counseling:  Mindful eating techniques: Encouraged slow meal pace, chewing foods to applesauce consistency for 20-30 minutes/meal.   Discussed  fluids 30 minutes before, during, and after meal to prevent dumping syndrome and discomfort post bariatric surgery.   Discussed pre/post operative diet progression, post op vitamin regimen, gave review of surgery process.     Instructions/Goals:     Continue implementing bariatric surgery lifestyle modifications.  Fill out food journal and return at follow up.    Handouts Provided:   [facility] Bariatric Surgery Nutrition Info  Protein supplement list    Monitor/Evaluation:  Pt. s target weight:  no gain from initial visit, pt. verbalized understanding.       Plan for next visit:   Bariatric plate. Give food journal homework.  Review Bariatric plate and food journal homework.  Educate on dumping syndrome and reading food labels.      Video-Visit Details    Type of service:  Video Visit    Video Start Time (time video started): 8:37 AM    Video End Time (time video stopped): 8:59 AM    Originating Location (pt. Location): Home      Distant Location (provider location):  On-site    Mode of Communication:  Video Conference via [facility]    Physician has received verbal consent for a Video Visit from the patient? Yes      Edwina Garcia RD

## 2024-05-21 NOTE — LETTER
5/21/2024         RE: Genaro Hamilton  [address]  [facility] [address]        Dear Colleague,    Thank you for referring your patient, Genaro Hamilton, to the [facility]ATRICS [facility]ARE [place]. Please see a copy of my visit note below.    Genaro Hamilton is a 43 year old who is being evaluated via a billable video visit.          How would you like to obtain your AVS? MyChart  If the video visit is dropped, the invitation should be resent by: Text to cell phone: [phone]  Will anyone else be joining your video visit? No          Follow Up Surgical Weight Loss Supervised Diet Evaluation    Assessment:  Pt. is being seen today for a follow up RD nutritional evaluation. Pt. has been unsuccessful with non-surgical weight loss methods and is interested in bariatric surgery. Today we reviewed current eating habits and level of physical activity, and instructed on the changes that are required for successful bariatric outcomes.    Surgery of interest per pt: RNY.    Weight Loss Medication: Wegovy - unable to get the next step and has not been taking    Workflow review:  Support Group: Not completed.  Psychology:In progress.  Lab work:Completed.  SWL:No - [facility] call program    Weight goal: At or below initial.    Anthropometrics:  Pt's weight is 262 lbs  Initial weight: 270 lbs  Weight change: 8 lbs down  BMI: 44.19  Ideal body weight: 56 kg (123 lb 7.5 oz)  Adjusted ideal body weight: 81.9 kg (180 lb 7.7 oz)    Medical History:  Patient Active Problem List   Diagnosis     Chronic hepatitis B virus infection (H)     Class 3 severe obesity due to excess calories without serious comorbidity with body mass index (BMI) of 45.0 to 49.9 in adult (H)     Primary hypertension     Financially insecure     Dyslipidemia     Metabolic syndrome     Esotropia     Relationship problems     Moderate episode of recurrent major depressive disorder (H)      Diabetes: no    Progress over  past month: Patient will fast and only eat once per day. Patient has been trying to increase her protein intake - yogurt, cottage cheese, protein shakes. Discussed the importance of protein intake with every meal. Discussed the importance of consistency with meals to help fuel weight loss. Encouraged patient to follow the bariatric plate method.     Diet Recall/Time  Breakfast: cereal (oatmeal) OR pancakes OR cottage cheese OR skips and only has coffee  Lunch (1-3 PM): smoothies with protein powder OR varies  Dinner: carrot and cabbage soup    Typical Snacks: sara    Overnight eating: No    Eating out: <1x/week    Meal duration: 30 or more minutes.      fluids by 30 minutes before, during meal, and waiting 30 minutes after meal before drinking fluids: No - has been working on this    Beverages  Water  Sparkling water  Coffee  Tea    Exercise  Dancing  Walking     PES statement:   Morbid obesity related to excessive energy intake as Estimated intake that exceeds estimated daily energy intake; and BMI 44.19       Intervention    Nutrition Education:   Provided general overview of diet and lifestyle modifications needed to be a deemed a safe candidate for bariatric surgery.   Educated patient on how to read a food label: choosing foods with than 10 grams fat and 10 grams sugar per serving to avoid dumping syndrome.  Dumping Syndrome: Described the mechanisms of syndrome, symptoms, and prevention tools from a dietary perspective.   Vitamins: Educated on post-op vitamin regimen including MVI+ 18 mg Fe two times a day, calcium citrate 400-600 mg two times a day, 0080-4957 mcg sublingual B12 daily, 5000 IU vitamin D3 daily.     Food/Nutrient Delivery:  Educated patient on eating three meals, with cutting out snacking.  Bariatric Plate: Patient and I discussed the importance of including a lean protein source (20-30 grams/meal), vegetables (included at lunch and dinner), one serving (15g) of carbohydrate, and  limited added fat (1 tb/day) at each meal.   Educated patient on how to complete a food journal and benefits of meal planning.   Educated patient on using a protein powder drink as a meal replacement and/or supplement after bariatric surgery.   Discussed importance of adequate hydration after surgery, with goal of at least 64 oz of fluids/day.  Addressed avoiding all carbonated, caffeinated and sweetened drinks to prepare for bariatric surgery.     Nutrition Counseling:  Mindful eating techniques: Encouraged slow meal pace, chewing foods to applesauce consistency for 20-30 minutes/meal.   Discussed  fluids 30 minutes before, during, and after meal to prevent dumping syndrome and discomfort post bariatric surgery.   Discussed pre/post operative diet progression, post op vitamin regimen, gave review of surgery process.     Instructions/Goals:     Continue implementing bariatric surgery lifestyle modifications.  Fill out food journal and return at follow up.    Handouts Provided:   Spl[facility] [place] [facility] Nutrition Info  Protein supplement list    Monitor/Evaluation:  Pt. s target weight:  no gain from initial visit, pt. verbalized understanding.       Plan for next visit:   Bariatric plate. Give food journal homework.  Review Bariatric plate and food journal homework.  Educate on dumping syndrome and reading food labels.      Video-Visit Details    Type of service:  Video Visit    Video Start Time (time video started): 8:37 AM    Video End Time (time video stopped): 8:59 AM    Originating Location (pt. Location): Home      Distant Location (provider location):  On-site    Mode of Communication:  Video Conference via [email or web address]    Physician has received verbal consent for a Video Visit from the patient? Yes      Edwina Garcia RD            Again, thank you for allowing me to participate in the care of your patient.        Sincerely,        Edwina Garcia RD

## 2024-06-05 ENCOUNTER — VIRTUAL VISIT (OUTPATIENT)
Dept: SURGERY | Facility: CLINIC | Age: 44
End: 2024-06-05
Payer: COMMERCIAL

## 2024-06-05 DIAGNOSIS — E66.01 MORBID OBESITY (H): Primary | ICD-10-CM

## 2024-06-05 NOTE — PROGRESS NOTES
"The patient has been notified of the following:      \"We have found that certain health care needs can be provided without the need for a face to face visit.  This service lets us provide the care you need with a phone conversation.       I will have full access to your [facility] medical record during this entire phone call.   I will be taking notes for your medical record.      Since this is like an office visit, we will bill your insurance company for this service.       There are potential benefits and risks of telephone visits (e.g. limits to patient confidentiality) that differ from in-person visits.?  Confidentiality still applies for telephone services, and nobody will record the visit.  It is important to be in a quiet, private space that is free of distractions (including cell phone or other devices) during the visit.??      If during the course of the call I believe a telephone visit is not appropriate, you will not be charged for this service\"     Consent has been obtained for this service by care team member: Yes    25 minutes    Recommendations: This patient has been struggling with recent distress around her financial picture. She was able to obtain a job, but the transition has not been easy for her. While she claims that this will not have an effect on her ability to focus on the surgery process, it is believed that she needs at least a few months to focus on her mental health and financial picture before she proceeds with surgery. In fact, she had an initial session with a therapist who recommended that she continue seeing him. Thus, she cannot be deemed a viable candidate for bariatric surgery from a psychosocial perspective. The final decision to follow through with bariatric surgery should be done in collaboration with [facility] Management [facility] staff.    Current Treatment Plan and Aftercare Plan: In order to be deemed a viable candidate for bariatric surgery, this " patient will need to complete the following treatment recommendations: Continue to meet with an individual psychotherapist to focus on improved coping mechanisms, if necessary, maintain medication management to promote mood stability, continue to meet with the dietitian and bariatrician to ensure appropriate nutrition and eating behaviors, attend support group meetings to improve knowledge of surgery and support around surgery, maintain mindful eating behaviors, document her eating, measure her food, plan out her meals, increase activity level and follow up with this clinician only after her therapist and dietitian feel that she is in a better position to proceed.    Special Needs, Personal Barriers, Potential Risks and Strengths: Monitor this patient's ability to avoid mindless eating, maintain mood stabilization and ensure that her financial situation is secure.

## 2024-06-28 ENCOUNTER — VIRTUAL VISIT (OUTPATIENT)
Dept: SURGERY | Facility: CLINIC | Age: 44
End: 2024-06-28
Payer: COMMERCIAL

## 2024-06-28 DIAGNOSIS — Z71.3 PRE-BARIATRIC SURGERY NUTRITION EVALUATION: ICD-10-CM

## 2024-06-28 DIAGNOSIS — E66.813 CLASS 3 SEVERE OBESITY DUE TO EXCESS CALORIES WITHOUT SERIOUS COMORBIDITY WITH BODY MASS INDEX (BMI) OF 40.0 TO 44.9 IN ADULT (H): Primary | ICD-10-CM

## 2024-06-28 DIAGNOSIS — E66.01 CLASS 3 SEVERE OBESITY DUE TO EXCESS CALORIES WITHOUT SERIOUS COMORBIDITY WITH BODY MASS INDEX (BMI) OF 40.0 TO 44.9 IN ADULT (H): Primary | ICD-10-CM

## 2024-06-28 PROCEDURE — 97803 MED NUTRITION INDIV SUBSEQ: CPT | Mod: 95 | Performed by: DIETITIAN, REGISTERED

## 2024-06-28 NOTE — LETTER
"6/28/2024      Genaro Hamilton  [address]  [facility] [address]      Dear Colleague,    Thank you for referring your patient, Genaro Hamilton, to the [facility] SURGERY CLINIC [facility] BARIATRICS CARE [place]. Please see a copy of my visit note below.    Genaro Hamilton is a 43 year old who is being evaluated via a billable video visit.      How would you like to obtain your AVS? MyChart  If the video visit is dropped, the invitation should be resent by: Text to cell phone: [phone]  Will anyone else be joining your video visit? No          Follow Up Surgical Weight Loss Supervised Diet Evaluation    Assessment:  Pt. is being seen today for a follow up RD nutritional evaluation. Pt. has been unsuccessful with non-surgical weight loss methods and is interested in bariatric surgery. Today we reviewed current eating habits and level of physical activity, and instructed on the changes that are required for successful bariatric outcomes.    Surgery of interest per pt: RNY.    Weight Loss Medication: Wegovy     Workflow review:  Support Group: Not completed. Plans to attend July  Psychology:In progress.   \"she cannot be deemed a viable candidate for bariatric surgery from a psychosocial perspective. The final decision to follow through with bariatric surgery should be done in collaboration with [facility] staff. \"  Lab work:Completed.  SWL:No - [facility] call program    Weight goal: At or below initial.    Anthropometrics:  Pt's weight is 255 lbs  Initial weight: 270 lbs  Weight change: 15 lbs, 5.6% weight loss  BMI: 43.0  Ideal body weight: 56 kg (123 lb 7.5 oz)  Adjusted ideal body weight: 81.9 kg (180 lb 7.7 oz)    Medical History:  Patient Active Problem List   Diagnosis     Chronic hepatitis B virus infection (H)     Class 3 severe obesity due to excess calories without serious comorbidity with body mass index (BMI) of 45.0 to 49.9 in adult (H)     " Primary hypertension     Financially insecure     Dyslipidemia     Metabolic syndrome     Esotropia     Relationship problems     Moderate episode of recurrent major depressive disorder (H)   Diabetes: no    Progress over past month: patient has been focusing on her protein intake. Patient was taking Wegovy but her coworker told her about another medication to try and is thinking of using that medication. Patient is trying to move and has a new job. Patient reported that she feels less stress and is choosing to be happy.     Diet Recall/Time  Breakfast: fasting  Lunch: vegetables, sweet potatoes with chicken breast OR eggs and beans OR fish with vegetables  Dinner (5-8 PM): lighter dinner    Typical Snacks: vegetables, fruit, protein smoothie    Eating out: 1-2x/week       fluids by 30 minutes before, during meal, and waiting 30 minutes after meal before drinking fluids: Yes    Beverages  Water  Sparkling water  Coffee  Tea    Exercise  Walking more  Has been going to the gym - 2x/week    PES statement:   Morbid obesity related to excessive energy intake as evidenced by Intake of high caloric density foods/beverages (juice, soda, alcohol) at meals and/or snacks; large portions; frequent grazing; Estimated intake that exceeds estimated daily energy intake; Frequent excessive fast food or restaurant intake; and BMI 43     Intervention    Nutrition Education:   Provided general overview of diet and lifestyle modifications needed to be a deemed a safe candidate for bariatric surgery.   Educated patient on how to read a food label: choosing foods with than 10 grams fat and 10 grams sugar per serving to avoid dumping syndrome.  Dumping Syndrome: Described the mechanisms of syndrome, symptoms, and prevention tools from a dietary perspective.   Vitamins: Educated on post-op vitamin regimen including MVI+ 18 mg Fe two times a day, calcium citrate 400-600 mg two times a day, 9234-6196 mcg sublingual B12 daily, 5000  IU vitamin D3 daily.     Food/Nutrient Delivery:  Educated patient on eating three meals, with cutting out snacking.  Bariatric Plate: Patient and I discussed the importance of including a lean protein source (20-30 grams/meal), vegetables (included at lunch and dinner), one serving (15g) of carbohydrate, and limited added fat (1 tb/day) at each meal.   Educated patient on how to complete a food journal and benefits of meal planning.   Educated patient on using a protein powder drink as a meal replacement and/or supplement after bariatric surgery.   Discussed importance of adequate hydration after surgery, with goal of at least 64 oz of fluids/day.  Addressed avoiding all carbonated, caffeinated and sweetened drinks to prepare for bariatric surgery.     Nutrition Counseling:  Mindful eating techniques: Encouraged slow meal pace, chewing foods to applesauce consistency for 20-30 minutes/meal.   Discussed  fluids 30 minutes before, during, and after meal to prevent dumping syndrome and discomfort post bariatric surgery.   Discussed pre/post operative diet progression, post op vitamin regimen, gave review of surgery process.     Instructions/Goals:     Continue implementing bariatric surgery lifestyle modifications.    Handouts Provided:   No resources requested    Monitor/Evaluation:  Pt. s target weight: no gain from initial visit, pt. verbalized understanding.     Plan for next visit:   Bariatric plate. Give food journal homework.  Review Bariatric plate and food journal homework.  Educate on dumping syndrome and reading food labels.  (Final supervised diet visit with RD) pre/post-op  diet progression, give review of surgery process.      Video-Visit Details    Type of service:  Video Visit    Video Start Time (time video started): 9:33 AM    Video End Time (time video stopped): 9:53 AM    Originating Location (pt. Location): Home      Distant Location (provider location):  Off-site    Mode of  Communication:  Video Conference via [facility]    Physician has received verbal consent for a Video Visit from the patient? Yes      Edwina Garcia RD             Again, thank you for allowing me to participate in the care of your patient.        Sincerely,        Edwina Garcia RD

## 2024-06-28 NOTE — PROGRESS NOTES
"Genaro Hamilton is a 43 year old who is being evaluated via a billable video visit.      How would you like to obtain your AVS? MyChart  If the video visit is dropped, the invitation should be resent by: Text to cell phone: [phone]  Will anyone else be joining your video visit? No          Follow Up Surgical Weight Loss Supervised Diet Evaluation    Assessment:  Pt. is being seen today for a follow up RD nutritional evaluation. Pt. has been unsuccessful with non-surgical weight loss methods and is interested in bariatric surgery. Today we reviewed current eating habits and level of physical activity, and instructed on the changes that are required for successful bariatric outcomes.    Surgery of interest per pt: RNY.    Weight Loss Medication: Wegovy     Workflow review:  Support Group: Not completed. Plans to attend July  Psychology:In progress.   \"she cannot be deemed a viable candidate for bariatric surgery from a psychosocial perspective. The final decision to follow through with bariatric surgery should be done in collaboration with [facility] Comprehensive [facility] Management Program staff. \"  Lab work:Completed.  SWL:No - HP call program    Weight goal: At or below initial.    Anthropometrics:  Pt's weight is 255 lbs  Initial weight: 270 lbs  Weight change: 15 lbs, 5.6% weight loss  BMI: 43.0  Ideal body weight: 56 kg (123 lb 7.5 oz)  Adjusted ideal body weight: 81.9 kg (180 lb 7.7 oz)    Medical History:  Patient Active Problem List   Diagnosis    Chronic hepatitis B virus infection (H)    Class 3 severe obesity due to excess calories without serious comorbidity with body mass index (BMI) of 45.0 to 49.9 in adult (H)    Primary hypertension    Financially insecure    Dyslipidemia    Metabolic syndrome    Esotropia    Relationship problems    Moderate episode of recurrent major depressive disorder (H)   Diabetes: no    Progress over past month: patient has been focusing on her protein intake. " Patient was taking Wegovy but her coworker told her about another medication to try and is thinking of using that medication. Patient is trying to move and has a new job. Patient reported that she feels less stress and is choosing to be happy.     Diet Recall/Time  Breakfast: fasting  Lunch: vegetables, sweet potatoes with chicken breast OR eggs and beans OR fish with vegetables  Dinner (5-8 PM): lighter dinner    Typical Snacks: vegetables, fruit, protein smoothie    Eating out: 1-2x/week       fluids by 30 minutes before, during meal, and waiting 30 minutes after meal before drinking fluids: Yes    Beverages  Water  Sparkling water  Coffee  Tea    Exercise  Walking more  Has been going to the gym - 2x/week    PES statement:   Morbid obesity related to excessive energy intake as evidenced by Intake of high caloric density foods/beverages (juice, soda, alcohol) at meals and/or snacks; large portions; frequent grazing; Estimated intake that exceeds estimated daily energy intake; Frequent excessive fast food or restaurant intake; and BMI 43     Intervention    Nutrition Education:   Provided general overview of diet and lifestyle modifications needed to be a deemed a safe candidate for bariatric surgery.   Educated patient on how to read a food label: choosing foods with than 10 grams fat and 10 grams sugar per serving to avoid dumping syndrome.  Dumping Syndrome: Described the mechanisms of syndrome, symptoms, and prevention tools from a dietary perspective.   Vitamins: Educated on post-op vitamin regimen including MVI+ 18 mg Fe two times a day, calcium citrate 400-600 mg two times a day, 3651-4189 mcg sublingual B12 daily, 5000 IU vitamin D3 daily.     Food/Nutrient Delivery:  Educated patient on eating three meals, with cutting out snacking.  Bariatric Plate: Patient and I discussed the importance of including a lean protein source (20-30 grams/meal), vegetables (included at lunch and dinner), one serving  (15g) of carbohydrate, and limited added fat (1 tb/day) at each meal.   Educated patient on how to complete a food journal and benefits of meal planning.   Educated patient on using a protein powder drink as a meal replacement and/or supplement after bariatric surgery.   Discussed importance of adequate hydration after surgery, with goal of at least 64 oz of fluids/day.  Addressed avoiding all carbonated, caffeinated and sweetened drinks to prepare for bariatric surgery.     Nutrition Counseling:  Mindful eating techniques: Encouraged slow meal pace, chewing foods to applesauce consistency for 20-30 minutes/meal.   Discussed  fluids 30 minutes before, during, and after meal to prevent dumping syndrome and discomfort post bariatric surgery.   Discussed pre/post operative diet progression, post op vitamin regimen, gave review of surgery process.     Instructions/Goals:     Continue implementing bariatric surgery lifestyle modifications.    Handouts Provided:   No resources requested    Monitor/Evaluation:  Pt. s target weight: no gain from initial visit, pt. verbalized understanding.     Plan for next visit:   Bariatric plate. Give food journal homework.  Review Bariatric plate and food journal homework.  Educate on dumping syndrome and reading food labels.  (Final supervised diet visit with RD) pre/post-op  diet progression, give review of surgery process.      Video-Visit Details    Type of service:  Video Visit    Video Start Time (time video started): 9:33 AM    Video End Time (time video stopped): 9:53 AM    Originating Location (pt. Location): Home      Distant Location (provider location):  Off-site    Mode of Communication:  Video Conference via [facility]    Physician has received verbal consent for a Video Visit from the patient? Yes      Edwina Garcia RD

## 2024-09-03 NOTE — PROGRESS NOTES
"Genaro Hamilton is a 44 year old who is being evaluated via a billable telephone visit.      What phone number would you like to be contacted at? [phone]  How would you like to obtain your AVS? Email        Medical  Weight Loss Follow-Up Diet Evaluation  Assessment:  Genaro is presenting today for a follow up weight management nutrition consultation.  This patient has had an initial appointment and was referred by Dr. Kothari for MNT as treatment for Morbid obesity   Weight loss medication:  Wegovy .   Pt's weight is 266 lbs  Initial weight: 270 lbs  Weight change: 4 lbs, 1.5% weight loss         No data to display              BMI: 44.86  Ideal body weight: 56 kg (123 lb 7.5 oz)  Adjusted ideal body weight: 81.9 kg (180 lb 7.7 oz)    Estimated RMR ([place]-St Jeor equation):   1,849 kcals x 1.2 (sedentary) = 2,219 kcals (for weight maintenance)  1,849 kcals x 1.3 (light active) = 2,543 kcals (for weight maintenance)  Recommended Protein Intake: 90 - 110 grams of protein/day  Patient Active Problem List:  Patient Active Problem List   Diagnosis    Chronic hepatitis B virus infection (H)    Class 3 severe obesity due to excess calories without serious comorbidity with body mass index (BMI) of 45.0 to 49.9 in adult (H)    Primary hypertension    Financially insecure    Dyslipidemia    Metabolic syndrome    Esotropia    Relationship problems    Moderate episode of recurrent major depressive disorder (H)   Diabetes: no, A1C of 5.4% on 12/15/2023    Dr. Benavides - 6/5/2024: \"she cannot be deemed a viable candidate for bariatric surgery from a psychosocial perspective. The final decision to follow through with bariatric surgery should be done in collaboration with [facility] Management Program staff. \"     Progress on goals from last visit: Patient has been having high stress with moving and her kids going  back to school. Patient is not planning on going through with surgery at this time - " will be focusing on injectables and her diet. Discussed meal delivery programs through insurance.     Dietary Recall:  Breakfast: protein shakes  Lunch: vegetables, sweet potatoes with chicken breast OR eggs and beans OR fish with vegetables  Dinner (5-8 PM): lighter dinner  Beverages:   Water - using SF flavoring  Sparkling water  Coffee  Tea  Exercise:   Walking more  Has been going to the gym - 2x/week  Nutrition Diagnosis:    Morbid obesity related to excessive energy intake as evidenced by BMI 44.86    Intervention:  Food and/or nutrient delivery: continue with exercise. Continue to have three balanced meals daily.   Nutrition education: recipe resources, meal delivery services    Monitoring/Evaluation:    Goals:  Prioritize protein with meals  Continue exercise routine    Patient to follow up PRN with bariatrician and 2 month(s) with RD      Phone call duration: 15 minutes      Distant Location (provider location):  On-site    Mode of Communication:  Telephone      Edwina Garcia RD

## 2024-09-04 ENCOUNTER — VIRTUAL VISIT (OUTPATIENT)
Dept: SURGERY | Facility: CLINIC | Age: 44
End: 2024-09-04
Payer: COMMERCIAL

## 2024-09-04 DIAGNOSIS — E66.01 CLASS 3 SEVERE OBESITY DUE TO EXCESS CALORIES WITHOUT SERIOUS COMORBIDITY WITH BODY MASS INDEX (BMI) OF 40.0 TO 44.9 IN ADULT (H): ICD-10-CM

## 2024-09-04 DIAGNOSIS — E66.01 MORBID OBESITY WITH BMI OF 40.0-44.9, ADULT (H): Primary | ICD-10-CM

## 2024-09-04 DIAGNOSIS — E66.813 CLASS 3 SEVERE OBESITY DUE TO EXCESS CALORIES WITHOUT SERIOUS COMORBIDITY WITH BODY MASS INDEX (BMI) OF 40.0 TO 44.9 IN ADULT (H): ICD-10-CM

## 2024-09-04 PROCEDURE — 97803 MED NUTRITION INDIV SUBSEQ: CPT | Mod: 93 | Performed by: DIETITIAN, REGISTERED

## 2024-09-04 NOTE — LETTER
"9/4/2024      Genaro Hamilton  [address]  [facility] [address]      Dear Colleague,    Thank you for referring your patient, Genaro Hamilton, to the [facility]. Please see a copy of my visit note below.    Genaro Hamilton is a 44 year old who is being evaluated via a billable telephone visit.      What phone number would you like to be contacted at? [phone]  How would you like to obtain your AVS? Email        Medical  Weight Loss Follow-Up Diet Evaluation  Assessment:  Genaro is presenting today for a follow up weight management nutrition consultation.  This patient has had an initial appointment and was referred by Dr. Kothari for MNT as treatment for Morbid obesity   Weight loss medication:  Wegovy .   Pt's weight is 266 lbs  Initial weight: 270 lbs  Weight change: 4 lbs, 1.5% weight loss         No data to display              BMI: 44.86  Ideal body weight: 56 kg (123 lb 7.5 oz)  Adjusted ideal body weight: 81.9 kg (180 lb 7.7 oz)    Estimated RMR ([place]-St Jeor equation):   1,849 kcals x 1.2 (sedentary) = 2,219 kcals (for weight maintenance)  1,849 kcals x 1.3 (light active) = 2,543 kcals (for weight maintenance)  Recommended Protein Intake: 90 - 110 grams of protein/day  Patient Active Problem List:  Patient Active Problem List   Diagnosis     Chronic hepatitis B virus infection (H)     Class 3 severe obesity due to excess calories without serious comorbidity with body mass index (BMI) of 45.0 to 49.9 in adult (H)     Primary hypertension     Financially insecure     Dyslipidemia     Metabolic syndrome     Esotropia     Relationship problems     Moderate episode of recurrent major depressive disorder (H)   Diabetes: no, A1C of 5.4% on 12/15/2023    Dr. Benavides - 6/5/2024: \"she cannot be deemed a viable candidate for bariatric surgery from a psychosocial perspective. The final decision to follow through with bariatric surgery should be " "done in collaboration with [facility] staff. \"     Progress on goals from last visit: Patient has been having high stress with moving and her kids going  back to school. Patient is not planning on going through with surgery at this time - will be focusing on injectables and her diet. Discussed meal delivery programs through insurance.     Dietary Recall:  Breakfast: protein shakes  Lunch: vegetables, sweet potatoes with chicken breast OR eggs and beans OR fish with vegetables  Dinner (5-8 PM): lighter dinner  Beverages:   Water - using SF flavoring  Sparkling water  Coffee  Tea  Exercise:   Walking more  Has been going to the gym - 2x/week  Nutrition Diagnosis:    Morbid obesity related to excessive energy intake as evidenced by BMI 44.86    Intervention:  Food and/or nutrient delivery: continue with exercise. Continue to have three balanced meals daily.   Nutrition education: recipe resources, meal delivery services    Monitoring/Evaluation:    Goals:  Prioritize protein with meals  Continue exercise routine    Patient to follow up PRN with bariatrician and 2 month(s) with MOUNA      Phone call duration: 15 minutes      Distant Location (provider location):  On-site    Mode of Communication:  Telephone      Edwina Garcia RD           Again, thank you for allowing me to participate in the care of your patient.        Sincerely,        Edwina Garcia RD  "

## 2024-09-04 NOTE — PATIENT INSTRUCTIONS
Meal Delivery Programs - All Pre-Made Meal Options    [employer]  Factor Meals  Eat Clean   Power Plates Meals  CookUnity  EveryPlate   Let's Dish      Check with insurance to see if these are covered - Mom's Meals or Homestyle Direct      Recipe Resources  Websites  www.Aureliant  www.Accruent  https://Krux.[employer]/recipes-ideas/recipes  https://Travellution/  www.VisiogenpesZelgor  https://www.BrandFiesta/    Blogs  https://Thinking Screen Media.net/  [employer]inny[employer].YOGITECH  WellnessfortCleversafe.com  https://www.Revel Systemsube.com/c/zachcoen - affordable meal prep  https://mealprePlanet Prestige/ - meal prep recipes  https://[employer].Bensata/recipe-index/      Books  The Volumetrics Eating Plan- Yudi Bragg, Ph. D  The Easy 5- Ingredient Healthy Cookbook: Simple Recipes to Make Healthy Eating Delicious- Luis M Hancock, MS, RD, CDN  The 30 Minute Mediterranean Diet Cookbook- Cadence Ivey MS, RDN and Padma Botello RDN  Weeknight Wonders: Delicious, Healthy Dinners in 30 min or Less- Padmini Spivey  Smart Meal Prep for Beginners- Luis M Hancock MS, RD, CDN

## 2024-10-24 NOTE — Clinical Note
Please schedule a 30 minute follow up on Wednesday, June 5 at 9 AM. PROVIDER:[TOKEN:[[id number]]]

## 2024-11-15 ENCOUNTER — PATIENT OUTREACH (OUTPATIENT)
Dept: CARE COORDINATION | Facility: CLINIC | Age: 44
End: 2024-11-15
Payer: COMMERCIAL

## 2025-01-08 ENCOUNTER — PATIENT OUTREACH (OUTPATIENT)
Dept: CARE COORDINATION | Facility: CLINIC | Age: 45
End: 2025-01-08
Payer: COMMERCIAL

## 2025-02-08 ENCOUNTER — HEALTH MAINTENANCE LETTER (OUTPATIENT)
Age: 45
End: 2025-02-08

## 2025-07-16 ENCOUNTER — TELEPHONE (OUTPATIENT)
Dept: FAMILY MEDICINE | Facility: CLINIC | Age: 45
End: 2025-07-16
Payer: COMMERCIAL

## 2025-07-16 ENCOUNTER — DOCUMENTATION ONLY (OUTPATIENT)
Dept: CARE COORDINATION | Facility: CLINIC | Age: 45
End: 2025-07-16
Payer: COMMERCIAL

## 2025-07-16 DIAGNOSIS — Z59.71 DOES NOT HAVE HEALTH INSURANCE: Primary | ICD-10-CM

## 2025-07-16 NOTE — CONFIDENTIAL NOTE
SW placed referral to [facility] FRW team this date. Patient's health insurance is inactive at this time.    BELKYS CANALES, TUYET, LADC

## 2025-07-16 NOTE — TELEPHONE ENCOUNTER
Patient Quality Outreach    Patient is due for the following:   Hypertension -  Hypertension follow-up visit    Action(s) Taken:   Patient declined follow up at this time.    Type of outreach:    Phone, spoke to patient/parent. Patient insurance is not active.     Questions for provider review:    None         Paw MARIANNE Truong  Chart routed to None.

## 2025-07-21 ENCOUNTER — PATIENT OUTREACH (OUTPATIENT)
Dept: CARE COORDINATION | Facility: CLINIC | Age: 45
End: 2025-07-21
Payer: COMMERCIAL

## 2025-07-21 NOTE — PROGRESS NOTES
Frw update 7/21/25  Frw established contact with the patient.   Pt has submitted a health care application. Application is pending for most recent pay stubs.   Case# [id number]    Phone call to the patient to update her on application status. Application status update was sent to email address. Email [email or web address]    Plan: Follow up with application eligibility in 30 days.

## 2025-08-20 ENCOUNTER — PATIENT OUTREACH (OUTPATIENT)
Dept: CARE COORDINATION | Facility: CLINIC | Age: 45
End: 2025-08-20
Payer: COMMERCIAL